# Patient Record
Sex: FEMALE | Race: WHITE | NOT HISPANIC OR LATINO | Employment: FULL TIME | URBAN - METROPOLITAN AREA
[De-identification: names, ages, dates, MRNs, and addresses within clinical notes are randomized per-mention and may not be internally consistent; named-entity substitution may affect disease eponyms.]

---

## 2018-07-24 ENCOUNTER — OFFICE VISIT CONVERTED (OUTPATIENT)
Dept: FAMILY MEDICINE CLINIC | Facility: CLINIC | Age: 33
End: 2018-07-24
Attending: NURSE PRACTITIONER

## 2018-10-24 ENCOUNTER — OFFICE VISIT CONVERTED (OUTPATIENT)
Dept: FAMILY MEDICINE CLINIC | Facility: CLINIC | Age: 33
End: 2018-10-24
Attending: NURSE PRACTITIONER

## 2018-11-19 ENCOUNTER — OFFICE VISIT (OUTPATIENT)
Dept: ORTHOPEDIC SURGERY | Facility: CLINIC | Age: 33
End: 2018-11-19

## 2018-11-19 VITALS — BODY MASS INDEX: 48.82 KG/M2 | WEIGHT: 293 LBS | TEMPERATURE: 98.7 F | HEIGHT: 65 IN

## 2018-11-19 DIAGNOSIS — S93.601A RIGHT FOOT SPRAIN, INITIAL ENCOUNTER: ICD-10-CM

## 2018-11-19 DIAGNOSIS — S99.921A FOOT INJURY, RIGHT, INITIAL ENCOUNTER: Primary | ICD-10-CM

## 2018-11-19 PROCEDURE — 99203 OFFICE O/P NEW LOW 30 MIN: CPT | Performed by: ORTHOPAEDIC SURGERY

## 2018-11-19 PROCEDURE — 73630 X-RAY EXAM OF FOOT: CPT | Performed by: ORTHOPAEDIC SURGERY

## 2018-11-19 RX ORDER — MONTELUKAST SODIUM 10 MG/1
TABLET ORAL
COMMUNITY
Start: 2018-11-13 | End: 2019-10-02

## 2018-11-19 RX ORDER — METHYLPHENIDATE HYDROCHLORIDE 20 MG/1
20 TABLET ORAL
COMMUNITY

## 2018-11-19 RX ORDER — ALBUTEROL SULFATE 90 UG/1
2 AEROSOL, METERED RESPIRATORY (INHALATION) EVERY 4 HOURS PRN
COMMUNITY
End: 2020-06-26 | Stop reason: SDUPTHER

## 2018-11-19 RX ORDER — IBUPROFEN 600 MG/1
600 TABLET ORAL
COMMUNITY
Start: 2017-07-11 | End: 2019-10-02
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
## 2018-12-14 ENCOUNTER — OFFICE VISIT CONVERTED (OUTPATIENT)
Dept: FAMILY MEDICINE CLINIC | Facility: CLINIC | Age: 33
End: 2018-12-14
Attending: NURSE PRACTITIONER

## 2019-01-23 ENCOUNTER — CONSULT (OUTPATIENT)
Dept: ORTHOPEDIC SURGERY | Facility: CLINIC | Age: 34
End: 2019-01-23

## 2019-01-23 VITALS — TEMPERATURE: 97.4 F | BODY MASS INDEX: 48.82 KG/M2 | WEIGHT: 293 LBS | HEIGHT: 65 IN

## 2019-01-23 DIAGNOSIS — S86.319A: ICD-10-CM

## 2019-01-23 DIAGNOSIS — S93.601A RIGHT FOOT SPRAIN, INITIAL ENCOUNTER: Primary | ICD-10-CM

## 2019-01-23 PROCEDURE — 99214 OFFICE O/P EST MOD 30 MIN: CPT | Performed by: ORTHOPAEDIC SURGERY

## 2019-01-23 PROCEDURE — 73630 X-RAY EXAM OF FOOT: CPT | Performed by: ORTHOPAEDIC SURGERY
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
## 2019-05-07 ENCOUNTER — TELEPHONE (OUTPATIENT)
Dept: ORTHOPEDIC SURGERY | Facility: CLINIC | Age: 34
End: 2019-05-07

## 2019-05-07 NOTE — TELEPHONE ENCOUNTER
Left message for patient to return call.     If / when patient calls, please notify that unfortunately [facility]'s schedule is full for tomorrow Wed 5/08/19 & that patient's appt is still scheduled for this Thursday 5/09/19 at 0800.

## 2019-05-07 NOTE — TELEPHONE ENCOUNTER
Notified patient that [facility] schedule full tmrw Wed 5/08/19 & patient to keep her appt Thurs 5/09/19 at 0800.

## 2019-05-07 NOTE — TELEPHONE ENCOUNTER
"Patient has OPNC/R FOOT/BIG TOE/NXR with [facility] this Thursday at 0800. Patient called stating her \"feet are hurting & it's painful to walk & stand\" - asking if [facility] will work in tomorrow Wed 5/08/19 instead? (Ok to work in at 11:10 AM?) Thanks /srh  "

## 2019-05-09 ENCOUNTER — OFFICE VISIT (OUTPATIENT)
Dept: ORTHOPEDIC SURGERY | Facility: CLINIC | Age: 34
End: 2019-05-09

## 2019-05-09 VITALS — BODY MASS INDEX: 48.82 KG/M2 | WEIGHT: 293 LBS | HEIGHT: 65 IN | TEMPERATURE: 98.1 F

## 2019-05-09 DIAGNOSIS — S92.354A CLOSED NONDISPLACED FRACTURE OF FIFTH METATARSAL BONE OF RIGHT FOOT, INITIAL ENCOUNTER: ICD-10-CM

## 2019-05-09 DIAGNOSIS — M79.672 LEFT FOOT PAIN: ICD-10-CM

## 2019-05-09 DIAGNOSIS — M77.42 METATARSALGIA OF LEFT FOOT: ICD-10-CM

## 2019-05-09 DIAGNOSIS — M19.079 ARTHRITIS OF FOOT: ICD-10-CM

## 2019-05-09 DIAGNOSIS — M79.671 RIGHT FOOT PAIN: Primary | ICD-10-CM

## 2019-05-09 PROCEDURE — 99214 OFFICE O/P EST MOD 30 MIN: CPT | Performed by: ORTHOPAEDIC SURGERY

## 2019-05-09 PROCEDURE — 73630 X-RAY EXAM OF FOOT: CPT | Performed by: ORTHOPAEDIC SURGERY

## 2019-05-09 RX ORDER — IPRATROPIUM BROMIDE AND ALBUTEROL SULFATE 2.5; .5 MG/3ML; MG/3ML
1 SOLUTION RESPIRATORY (INHALATION)
COMMUNITY
End: 2019-10-02
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
## 2019-05-30 ENCOUNTER — OFFICE VISIT (OUTPATIENT)
Dept: ORTHOPEDIC SURGERY | Facility: CLINIC | Age: 34
End: 2019-05-30

## 2019-05-30 VITALS — TEMPERATURE: 98.1 F | WEIGHT: 293 LBS | HEIGHT: 65 IN | BODY MASS INDEX: 48.82 KG/M2

## 2019-05-30 DIAGNOSIS — M19.079 ARTHRITIS OF FOOT: ICD-10-CM

## 2019-05-30 DIAGNOSIS — M79.671 RIGHT FOOT PAIN: Primary | ICD-10-CM

## 2019-05-30 DIAGNOSIS — M77.42 METATARSALGIA OF LEFT FOOT: ICD-10-CM

## 2019-05-30 DIAGNOSIS — S92.354D CLOSED NONDISPLACED FRACTURE OF FIFTH METATARSAL BONE OF RIGHT FOOT WITH ROUTINE HEALING, SUBSEQUENT ENCOUNTER: ICD-10-CM

## 2019-05-30 PROCEDURE — 73630 X-RAY EXAM OF FOOT: CPT | Performed by: ORTHOPAEDIC SURGERY

## 2019-05-30 PROCEDURE — 99213 OFFICE O/P EST LOW 20 MIN: CPT | Performed by: ORTHOPAEDIC SURGERY
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
## 2019-08-13 ENCOUNTER — LAB (OUTPATIENT)
Dept: LAB | Facility: HOSPITAL | Age: 34
End: 2019-08-13

## 2019-08-13 ENCOUNTER — TELEPHONE (OUTPATIENT)
Dept: URGENT CARE | Facility: CLINIC | Age: 34
End: 2019-08-13

## 2019-08-13 ENCOUNTER — TRANSCRIBE ORDERS (OUTPATIENT)
Dept: ADMINISTRATIVE | Facility: HOSPITAL | Age: 34
End: 2019-08-13

## 2019-08-13 ENCOUNTER — HOSPITAL ENCOUNTER (OUTPATIENT)
Dept: GENERAL RADIOLOGY | Facility: HOSPITAL | Age: 34
Discharge: HOME | End: 2019-08-13
Admitting: FAMILY MEDICINE

## 2019-08-13 ENCOUNTER — APPOINTMENT (OUTPATIENT)
Dept: GENERAL RADIOLOGY | Facility: HOSPITAL | Age: 34
End: 2019-08-13

## 2019-08-13 DIAGNOSIS — J06.9 ACUTE URI: Primary | ICD-10-CM

## 2019-08-13 DIAGNOSIS — J06.9 ACUTE URI: ICD-10-CM

## 2019-08-13 LAB
ALBUMIN SERPL-MCNC: 3.5 G/DL (ref 3.5–4.8)
ALBUMIN/GLOB SERPL: 1.2 G/DL (ref 1–1.7)
ALP SERPL-CCNC: 75 U/L (ref 32–91)
ALT SERPL W P-5'-P-CCNC: 25 U/L (ref 14–54)
ANION GAP SERPL CALCULATED.3IONS-SCNC: 14.2 MMOL/L (ref 5–15)
AST SERPL-CCNC: 21 U/L (ref 15–41)
BASOPHILS # BLD AUTO: 0.1 10*3/MM3 (ref 0–0.2)
BASOPHILS NFR BLD AUTO: 1.3 % (ref 0–1.5)
BILIRUB SERPL-MCNC: 0.7 MG/DL (ref 0.3–1.2)
BUN BLD-MCNC: 9 MG/DL (ref 8–20)
BUN/CREAT SERPL: 11.3 (ref 5.4–26.2)
CALCIUM SPEC-SCNC: 9.2 MG/DL (ref 8.9–10.3)
CHLORIDE SERPL-SCNC: 103 MMOL/L (ref 101–111)
CO2 SERPL-SCNC: 23 MMOL/L (ref 22–32)
CREAT BLD-MCNC: 0.8 MG/DL (ref 0.4–1)
DEPRECATED RDW RBC AUTO: 41.6 FL (ref 37–54)
EOSINOPHIL # BLD AUTO: 0.3 10*3/MM3 (ref 0–0.4)
EOSINOPHIL NFR BLD AUTO: 3.8 % (ref 0.3–6.2)
ERYTHROCYTE [DISTWIDTH] IN BLOOD BY AUTOMATED COUNT: 13.3 % (ref 12.3–15.4)
GFR SERPL CREATININE-BSD FRML MDRD: 83 ML/MIN/1.73
GLOBULIN UR ELPH-MCNC: 3 GM/DL (ref 2.5–3.8)
GLUCOSE BLD-MCNC: 91 MG/DL (ref 65–99)
HCT VFR BLD AUTO: 42.2 % (ref 34–46.6)
HGB BLD-MCNC: 14.5 G/DL (ref 12–15.9)
LYMPHOCYTES # BLD AUTO: 3 10*3/MM3 (ref 0.7–3.1)
LYMPHOCYTES NFR BLD AUTO: 37.7 % (ref 19.6–45.3)
MCH RBC QN AUTO: 32.1 PG (ref 26.6–33)
MCHC RBC AUTO-ENTMCNC: 34.2 G/DL (ref 31.5–35.7)
MCV RBC AUTO: 93.6 FL (ref 79–97)
MONOCYTES # BLD AUTO: 0.7 10*3/MM3 (ref 0.1–0.9)
MONOCYTES NFR BLD AUTO: 8.4 % (ref 5–12)
NEUTROPHILS # BLD AUTO: 3.9 10*3/MM3 (ref 1.7–7)
NEUTROPHILS NFR BLD AUTO: 48.8 % (ref 42.7–76)
NRBC BLD AUTO-RTO: 0.1 /100 WBC (ref 0–0.2)
PLATELET # BLD AUTO: 325 10*3/MM3 (ref 140–450)
PMV BLD AUTO: 8.8 FL (ref 6–12)
POTASSIUM BLD-SCNC: 4.2 MMOL/L (ref 3.6–5.1)
PROT SERPL-MCNC: 6.5 G/DL (ref 6.1–7.9)
RBC # BLD AUTO: 4.51 10*6/MM3 (ref 3.77–5.28)
SODIUM BLD-SCNC: 136 MMOL/L (ref 136–144)
WBC NRBC COR # BLD: 8 10*3/MM3 (ref 3.4–10.8)

## 2019-08-13 PROCEDURE — 80053 COMPREHEN METABOLIC PANEL: CPT

## 2019-08-13 PROCEDURE — 36415 COLL VENOUS BLD VENIPUNCTURE: CPT

## 2019-08-13 PROCEDURE — 71046 X-RAY EXAM CHEST 2 VIEWS: CPT

## 2019-08-13 PROCEDURE — 85025 COMPLETE CBC W/AUTO DIFF WBC: CPT

## 2019-10-10 ENCOUNTER — OFFICE VISIT (OUTPATIENT)
Dept: ORTHOPEDIC SURGERY | Facility: CLINIC | Age: 34
End: 2019-10-10

## 2019-10-10 ENCOUNTER — TELEPHONE (OUTPATIENT)
Dept: ORTHOPEDIC SURGERY | Facility: CLINIC | Age: 34
End: 2019-10-10

## 2019-10-10 VITALS — HEIGHT: 64 IN | WEIGHT: 293 LBS | TEMPERATURE: 98.7 F | BODY MASS INDEX: 50.02 KG/M2

## 2019-10-10 DIAGNOSIS — M25.561 RIGHT KNEE PAIN, UNSPECIFIED CHRONICITY: Primary | ICD-10-CM

## 2019-10-10 DIAGNOSIS — M23.91 LOCKING KNEE, RIGHT: ICD-10-CM

## 2019-10-10 DIAGNOSIS — M25.561 ACUTE PAIN OF RIGHT KNEE: ICD-10-CM

## 2019-10-10 PROCEDURE — 73562 X-RAY EXAM OF KNEE 3: CPT | Performed by: NURSE PRACTITIONER

## 2019-10-10 PROCEDURE — 99213 OFFICE O/P EST LOW 20 MIN: CPT | Performed by: NURSE PRACTITIONER

## 2019-10-10 NOTE — PROGRESS NOTES
Patient Name: Joann Smith   YOB: 1985  Referring Primary Care Physician: Angle Felix APRN  BMI: Body mass index is 56.64 kg/m².    Chief Complaint:    Chief Complaint   Patient presents with   • Right Knee - Establish Care        HPI: Knee Pain: Patient presents with a knee injury involving the  right knee. Onset of the symptoms was about a month ago. Inciting event: injured while in grocery turned and felt a pop. Current symptoms include giving out, locking and popping sensation. Pain is aggravated by going up and down stairs, lateral movements, pivoting, rising after sitting, running and squatting.  Patient has had no prior knee problems. Evaluation to date: ICC visit. Treatment to date: avoidance of offending activity, ice and OTC analgesics which are somewhat effective.  lmp 1 month ago -  deployed  Joann Smith is a 34 y.o. female who presents today for evaluation of   Chief Complaint   Patient presents with   • Right Knee - Establish Care   [facility] PT FOR FEET    This problem is new to this examiner.     Subjective   Medications:   Home Medications:  Current Outpatient Medications on File Prior to Visit   Medication Sig   • acyclovir (ZOVIRAX) 5 % ointment    • albuterol (PROVENTIL HFA;VENTOLIN HFA) 108 (90 Base) MCG/ACT inhaler Inhale 2 puffs Every 4 (Four) Hours As Needed for Wheezing.   • Fexofenadine HCl (ALLEGRA ALLERGY PO) Take  by mouth.   • methylphenidate (RITALIN) 20 MG tablet Take 20 mg by mouth.   • [DISCONTINUED] albuterol sulfate  (90 Base) MCG/ACT inhaler Inhale 2 puffs.   • [DISCONTINUED] diclofenac (VOLTAREN) 50 MG EC tablet Take 1 tablet by mouth 3 (Three) Times a Day As Needed (pain).     No current facility-administered medications on file prior to visit.      Current Medications:  Scheduled Meds:  Continuous Infusions:  No current facility-administered medications for this visit.   PRN Meds:.    I have reviewed the patient's medical history in detail and  updated the computerized patient record.  Review and summarization of old records includes:    Past Medical History:   Diagnosis Date   • Arthritis of foot 5/9/2019   • Asthma    • Bone cyst 1988 or 1989    I had a bone cyst removal on my right middle fingerm   • Fractures Various    Hairlines in my right wrist and both ankles   • Hernia, hiatal         Past Surgical History:   Procedure Laterality Date   • BONE CYST EXCISION      right hand knuckle, middle finger   • SINUS SURGERY     • TONSILLECTOMY     • WISDOM TOOTH EXTRACTION          Social History     Occupational History   • Not on file   Tobacco Use   • Smoking status: Never Smoker   • Smokeless tobacco: Never Used   Substance and Sexual Activity   • Alcohol use: No     Frequency: Never   • Drug use: No   • Sexual activity: Yes     Partners: Male     Birth control/protection: None      Social History     Social History Narrative   • Not on file        Family History   Problem Relation Age of Onset   • Hypertension Father    • Cancer Father         thyroid   • Diabetes Paternal Aunt    • Diabetes Paternal Uncle    • Diabetes Paternal Grandmother    • Cancer Paternal Grandmother         brain       ROS: 14 point review of systems was performed and all other systems were reviewed and are negative except for documented findings in HPI and today's encounter.     Allergies:   Allergies   Allergen Reactions   • Vancomycin Other (See Comments)     Pt. Reports she passed out    • Latex Rash   • Penicillins Rash     Constitutional:  Denies fever, shaking or chills   Eyes:  Denies change in visual acuity   HENT:  Denies nasal congestion or sore throat   Respiratory:  Denies cough or shortness of breath   Cardiovascular:  Denies chest pain or severe LE edema   GI:  Denies abdominal pain, nausea, vomiting, bloody stools or diarrhea   Musculoskeletal:  Numbness, tingling, pain, or loss of motor function only as noted above in history of present illness.  : Denies  "painful urination or hematuria  Integument:  Denies rash, lesion or ulceration   Neurologic:  Denies headache or focal weakness  Endocrine:  Denies lymphadenopathy  Psych:  Denies confusion or change in mental status   Hem:  Denies active bleeding    OBJECTIVE:  Physical Exam:   Temp 98.7 °F (37.1 °C) (Temporal)   Ht 162.6 cm (64\")   Wt (!) 150 kg (330 lb)   BMI 56.64 kg/m²     General Appearance:    Alert, cooperative, in no acute distress                  Eyes: conjunctiva clear  ENT: external ears and nose atraumatic  CV: no peripheral edema  Resp: normal respiratory effort  Skin: no rashes or wounds; normal turgor  Psych: mood and affect appropriate  Lymph: no nodes appreciated  Neuro: gross sensation intact  Vascular:  Palpable peripheral pulse in noted extremity  Musculoskeletal Extremities: Right knee tender to patient at the medial joint line skin is warm dry and intact calf is soft and nontender hip is nontender patient is neurovascular intact has locking and positive Bess's her leg is large and some pain under the kneecap.    Radiology:   3 views of right knee show no acute fracture done for acute pain no readily available views for comparison    Assessment:     ICD-10-CM ICD-9-CM   1. Right knee pain, unspecified chronicity M25.561 719.46   2. Acute pain of right knee M25.561 719.46   3. Locking knee, right M23.91 717.9        Procedures  We will check an MRI and see if there is a meniscus tear and patient will continue some conservative treatment thank you    Plan: Biomechanics of pertinent body area discussed.  Risks, benefits, alternatives, comparisons, and complications of accepted medicines, injections, recommendations, surgical procedures, and therapies explained and education provided in laymen's terms. Natural history and expected course of this patient's diagnosis discussed along with evaluation of therapies. Questions answered. When appropriate I also discussed proper use of cane, walker, " DocDep.   BMI:  The concept of BMI body mass index and its importance and implications discussed.  BMI suggested to be < 40 or as low as possible. Lifestyle measures for weight loss and how this affects orthopedic condition.  MEDICATIONS:  Prescription, OTC and Monitoring of Medications per orders to address ortho complaints; Evaluation and discussion of safety, precautions, side effects, and warnings given especially of long term NSAID or steroid therapy.    RICE: Rest, ice, compression, and elevation therapy, Cryotherapy/brachy therapy, and or OTC linaments as indicated with instructions.   MRI.      10/10/2019    Much of this encounter note is an electronic transcription/translation of spoken language to printed text. The electronic translation of spoken language may permit erroneous, or at times, nonsensical words or phrases to be inadvertently transcribed; Although I have reviewed the note for such errors, some may still exist

## 2019-10-10 NOTE — PATIENT INSTRUCTIONS
RICE for Routine Care of Injuries  Many injuries can be cared for using rest, ice, compression, and elevation (RICE therapy). Using RICE therapy can help to lessen pain and swelling. It can help your body to heal.  Rest  Reduce your normal activities and avoid using the injured part of your body. You can go back to your normal activities when you feel okay and your doctor says it is okay.  Ice  Do not put ice on your bare skin.  · Put ice in a plastic bag.  · Place a towel between your skin and the bag.  · Leave the ice on for 20 minutes, 2-3 times a day.  Do this for as long as told by your doctor.  Compression  Compression means putting pressure on the injured area. This can be done with an elastic bandage. If an elastic bandage has been applied:  · Remove and reapply the bandage every 3-4 hours or as told by your doctor.  · Make sure the bandage is not wrapped too tight. Wrap the bandage more loosely if part of your body beyond the bandage is blue, swollen, cold, painful, or loses feeling (numb).  · See your doctor if the bandage seems to make your problems worse.  Elevation  Elevation means keeping the injured area raised. Raise the injured area above your heart or the center of your chest if you can.  WHEN SHOULD I GET HELP?  You should get help if:  · You keep having pain and swelling.  · Your symptoms get worse.  WHEN SHOULD I GET HELP RIGHT AWAY?  You should get help right away if:  · You have sudden bad pain at or below the area of your injury.  · You have redness or more swelling around your injury.  · You have tingling or numbness at or below the injury that does not go away when you take off the bandage.     This information is not intended to replace advice given to you by your health care provider. Make sure you discuss any questions you have with your health care provider.     Document Released: 06/05/2009 Document Revised: 09/07/2016 Document Reviewed: 11/25/2015  [employer] Patient  Education ©2017 [employer]

## 2019-10-18 ENCOUNTER — HOSPITAL ENCOUNTER (OUTPATIENT)
Dept: MRI IMAGING | Facility: HOSPITAL | Age: 34
Discharge: HOME | End: 2019-10-18
Admitting: NURSE PRACTITIONER

## 2019-10-18 DIAGNOSIS — M23.91 LOCKING KNEE, RIGHT: ICD-10-CM

## 2019-10-18 DIAGNOSIS — M25.561 RIGHT KNEE PAIN, UNSPECIFIED CHRONICITY: ICD-10-CM

## 2019-10-18 DIAGNOSIS — M25.561 ACUTE PAIN OF RIGHT KNEE: ICD-10-CM

## 2019-10-18 PROCEDURE — 73721 MRI JNT OF LWR EXTRE W/O DYE: CPT

## 2019-10-21 ENCOUNTER — TELEPHONE (OUTPATIENT)
Dept: ORTHOPEDIC SURGERY | Facility: CLINIC | Age: 34
End: 2019-10-21

## 2019-10-22 NOTE — TELEPHONE ENCOUNTER
BLANCHE HAS SENT ME THE RESULTS OF THIS.  I WILL SEND TO YOU. IF YOU COULD CALL HER WITH THEM PLEASE.

## 2019-10-22 NOTE — TELEPHONE ENCOUNTER
I have not seen her for her knee.  It looks like Poonam saw her for this.  Please have her call her about MRI results and treatment going forward

## 2019-10-22 NOTE — TELEPHONE ENCOUNTER
Patient informed of results and wants to know if she can just follow up with [facility] as previously scheduled.  I told her I would check with him and call her back.    Per Poonam, MRI knee - no tear in ligament or cartillage - some arthritis under kneecap. Can follow with sports med for physical therapy and non surgical options - work on quad strengthening and weight loss all help stability.

## 2019-10-24 ENCOUNTER — APPOINTMENT (OUTPATIENT)
Dept: MRI IMAGING | Facility: HOSPITAL | Age: 34
End: 2019-10-24

## 2019-10-28 ENCOUNTER — OFFICE VISIT (OUTPATIENT)
Dept: ORTHOPEDIC SURGERY | Facility: CLINIC | Age: 34
End: 2019-10-28

## 2019-10-28 VITALS — BODY MASS INDEX: 50.02 KG/M2 | HEIGHT: 64 IN | TEMPERATURE: 97.5 F | WEIGHT: 293 LBS

## 2019-10-28 DIAGNOSIS — M94.261 CHONDROMALACIA OF RIGHT KNEE: Primary | ICD-10-CM

## 2019-10-28 PROCEDURE — 99214 OFFICE O/P EST MOD 30 MIN: CPT | Performed by: ORTHOPAEDIC SURGERY

## 2019-10-28 NOTE — PROGRESS NOTES
"Knee Exam      Patient: Joann Smith    YOB: 1985 34 y.o. female    Chief Complaints: knee doing a little better    History of Present Illness:Patient was seen by Poonam on 10/10/2019 and injured her right knee approximately 1 month prior to that when she was in a grocery and turned and felt a pop.    She was instructed that time to continue with ice and limit activity.  She had been taking an oral anti-inflammatory she thinks it was diclofenac that was given to her by the urgent care and has subsequent been using Advil.    Her pain has improved with mild intermittent aching pain over the anteromedial aspect of the right knee worse with steps.  She has not had any recurrent injury.  She evidently had some problems with the knee in the past and has done some therapy in the past and resume doing some of the exercises she had done previously.    I have seen her in the past for other injuries to her feet and was last seen on 5/30/2019.  Please see that note for any details regarding that  HPI    ROS: knee pain  Past Medical History:   Diagnosis Date   • Arthritis of foot 5/9/2019   • Asthma    • Bone cyst 1988 or 1989    I had a bone cyst removal on my right middle fingerm   • Fractures Various    Hairlines in my right wrist and both ankles   • Hernia, hiatal        Physical Exam:   Vitals:    10/28/19 0844   Temp: 97.5 °F (36.4 °C)   TempSrc: Temporal   Weight: (!) 154 kg (339 lb 9.6 oz)   Height: 162.6 cm (64\")     Well developed with normal mood.  On examination of the right knee do not appreciate any effusion but difficult to tell due to body habitus.  There was mild discomfort over the anteromedial aspect of the knee along the medial facet of the patella but apprehension or patellar instability.  Unable to elicit any exacerbation of pain with Bess's and grossly stable ligamentous exam.  No focal pain or fullness in the posterior aspect of the knee      Radiology: 3 views of the right knee " reviewed from 10/10/2019 which overall show relatively well-maintained joint space may be some mild narrowing medially at the patellofemoral joint but overall joints appear to be congruent without significant deformity.    MRI films and report of the right knee dated 10/18/2019 reviewed which show small joint effusion and a small partially ruptured Baker's cyst.  Meniscus appears intact bilaterally as to the ligaments.  There is some questionable partial-thickness fissure seen within the patellar cartilage and cartilage the medial and lateral compartments appears unremarkable.  No obvious suspicious osseous edema.      Assessment/Plan: 1.  Right knee pain with improving chondromalacial symptoms of the patella  2.  Asymptomatic right Baker's cyst    Reviewed with her that I do not see anything further to do at this point as far as any surgical treatment and she is improving with nonoperative measures.    She had taken some type of oral anti-inflammatories that have been prescribed at urgent care but is no longer using that and just using ibuprofen with continued relief.    She may continue with that as needed and have her limit any squatting kneeling or twisting activities and limit steps.    She may apply Pennsaid to the area twice daily as needed and I have her start some physical therapy as well.    I will see her back as needed

## 2019-10-31 ENCOUNTER — TREATMENT (OUTPATIENT)
Dept: PHYSICAL THERAPY | Facility: CLINIC | Age: 34
End: 2019-10-31

## 2019-10-31 DIAGNOSIS — M94.261 CHONDROMALACIA OF RIGHT KNEE: Primary | ICD-10-CM

## 2019-10-31 PROCEDURE — 97110 THERAPEUTIC EXERCISES: CPT | Performed by: PHYSICAL THERAPIST

## 2019-10-31 PROCEDURE — 97161 PT EVAL LOW COMPLEX 20 MIN: CPT | Performed by: PHYSICAL THERAPIST

## 2019-10-31 PROCEDURE — 97140 MANUAL THERAPY 1/> REGIONS: CPT | Performed by: PHYSICAL THERAPIST

## 2019-10-31 NOTE — PROGRESS NOTES
Physical Therapy Initial Evaluation and Plan of Care    Patient: Joann Smith   : 1985  Diagnosis/ICD-10 Code:  Chondromalacia of right knee [M94.261]  Referring practitioner: Mike Huynh*  Date of Initial Visit: 10/31/2019  Today's Date: 10/31/2019  Patient seen for 1 session           Subjective Questionnaire: LEFS: 27 points      Subjective Evaluation    History of Present Illness  Onset date: 2019.  Mechanism of injury: Patient presents to physical therapy with orders to address R knee pain.  She states that it has been bothering her since the end of September.  She doesn't recall a specific trauma.  She states that she just twisted wrong on it at the grocery store.  They initially thought she had a torn meniscus but her MRI was negative.   It showed a partially ruptured Baker's cyst and some fissures in the knee.  She was diagnosed with chondromalacia and referred here for physical therapy.    She states that she has trouble with prolonged standing and prolonged sitting.   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  She states that it has been a challenge getting into bed because it hurts to put pressure on the center of her knee.       Patient Occupation:  for [employer] and [employer] at the [employer], also working at the . Pain  Current pain ratin (Raining outside)  At best pain ratin  At worst pain ratin  Location: R knee medial joint line  Relieving factors: ice and rest (elevation)  Aggravating factors: prolonged positioning, standing, squatting and ambulation (kneeling, prolonged sitting)    Social Support  Lives in: one-story house  Lives with: spouse (-active duty )    Diagnostic Tests  MRI studies: abnormal    Patient Goals  Patient goals for therapy: decreased pain, increased strength, return to work and  increased motion  Patient goal: put her own socks on independently       Objective       Static Posture   General Observations  Left leg length discrepancy.     Knee   Knee (Left): Recurvatum.   Knee (Right): Flexed.     Palpation     Right Tenderness of the vastus medialis.     Tenderness     Right Knee   Tenderness in the MCL (distal), MCL (proximal), medial joint line and medial retinaculum.     Neurological Testing     Sensation     Knee     Right Knee   Intact: light touch     Active Range of Motion   Left Knee   Flexion: 105 degrees   Extension: 10 degrees     Right Knee   Flexion: 100 degrees   Extension: 0 degrees     Patellar Mobility     Right Knee Hypomobile in the medial, lateral, superior and inferior patellar tendon(s).     Patellar Static Positioning   Right Knee: lateral tilt    Strength/Myotome Testing     Right Hip   Planes of Motion   Flexion: 4  Extension: 3+  Abduction: 3+  Adduction: 3+    Right Knee   Flexion: 4+  Extension: 4  Quadriceps contraction: fair         Assessment & Plan     Assessment  Impairments: abnormal gait, abnormal muscle firing, abnormal or restricted ROM, activity intolerance, impaired balance, impaired physical strength, lacks appropriate home exercise program, pain with function and weight-bearing intolerance  Assessment details: The patient is a 34 y.o. female who presents to physical therapy today for R knee pain and chondromalacia. Upon initial evaluation, the patient demonstrates the impairments listed above. Due to these impairments, the patient is unable to tolerate a full day of activity and complete her work tasks without pain. The patient would benefit from skilled PT services to address functional limitations and impairments and to improve patient quality of life.    Prognosis: good  Functional Limitations: walking, uncomfortable because of pain, sitting, standing, stooping and unable to perform repetitive tasks  Goals  Plan Goals: STG's: 3 weeks   Patient will  report pain level at worse </= 5/10 for improved tolerance to ADLs and functional mobility  Patient will require <3 tactile or verbal cues for proper mechanics during completion of her HEP      LTG's: By Discharge  Patient will report pain levels at worst </= 3/10 for improved tolerance to ADLs and functional mobility  Patient will be able to ambulate unlimited distances and no increased pain  Patient will be able to complete single leg stance on stable surface with no UE support, with supervision for durations > 15 seconds on R LE to decrease risk of falls  Patient will report >75% improvement in her ability to tolerate sitting for durations > 60 minutes per reduction in her symptoms   Patient will report improved levels of overall function as demonstrated by an increase in her reported level of function score on the LEFS to >/= 50/80    Patient will report improvement in their tolerance to sleeping and report waking up </= 1x/night per positional discomfort  Patient will require no tactile or verbal cues for proper mechaics during completion of her HEP for final independence     Plan  Therapy options: will be seen for skilled physical therapy services  Planned modality interventions: cryotherapy, electrical stimulation/Russian stimulation, TENS, thermotherapy (hydrocollator packs) and ultrasound  Other planned modality interventions: Dry Needling  Planned therapy interventions: balance/weight-bearing training, flexibility, functional ROM exercises, gait training, home exercise program, joint mobilization, manual therapy, neuromuscular re-education, soft tissue mobilization, strengthening, stretching and therapeutic activities  Duration in visits: 12  Treatment plan discussed with: patient        History # of Personal Factors and/or Comorbidities: LOW (0)  Examination of Body System(s): # of elements: MODERATE (3)  Clinical Presentation: STABLE   Clinical Decision Making: LOW       Timed:         Manual Therapy:     10     mins  22540;     Therapeutic Exercise:    15     mins  22616;     Neuromuscular Curt:        mins  95097;    Therapeutic Activity:          mins  80463;     Gait Training:           mins  49531;     Ultrasound:          mins  93619;    Ionto                                   mins   50941  Self Care                            mins   60259  Canalith Repos         mins 27719      Un-Timed:  Electrical Stimulation:         mins  87674 ( );  Dry Needling          mins self-pay  Traction          mins 98571  Low Eval     20     Mins  37019  Mod Eval          Mins  81033  High Eval                            Mins  46184  Re-Eval                               mins  09167        Timed Treatment:   25   mins   Total Treatment:     55   mins    PT SIGNATURE: Leatha Bolivar PT   DATE TREATMENT INITIATED: 10/31/2019    Initial Certification  Certification Period: 1/29/2020  I certify that the therapy services are furnished while this patient is under my care.  The services outlined above are required by this patient, and will be reviewed every 90 days.     PHYSICIAN: Mike Huynh MD      DATE:     Please sign and return via fax to [phone].. Thank you, [facility].

## 2019-11-04 ENCOUNTER — TREATMENT (OUTPATIENT)
Dept: PHYSICAL THERAPY | Facility: CLINIC | Age: 34
End: 2019-11-04

## 2019-11-04 DIAGNOSIS — M94.261 CHONDROMALACIA OF RIGHT KNEE: Primary | ICD-10-CM

## 2019-11-04 PROCEDURE — 97110 THERAPEUTIC EXERCISES: CPT | Performed by: PHYSICAL THERAPIST

## 2019-11-04 PROCEDURE — 97014 ELECTRIC STIMULATION THERAPY: CPT | Performed by: PHYSICAL THERAPIST

## 2019-11-04 PROCEDURE — 97140 MANUAL THERAPY 1/> REGIONS: CPT | Performed by: PHYSICAL THERAPIST

## 2019-11-06 ENCOUNTER — TELEPHONE (OUTPATIENT)
Dept: ORTHOPEDIC SURGERY | Facility: CLINIC | Age: 34
End: 2019-11-06

## 2019-11-06 NOTE — TELEPHONE ENCOUNTER
Patient given Pennsaid solution Rx from [facility]. Says insurance does not cover it and she does not qualify for discount. Will be $2,500. [facility] [phone]  Please advise.

## 2019-11-06 NOTE — TELEPHONE ENCOUNTER
I spoke with the patient and he said that the Pennsaid was not covered through her insurance that she spoke with someone about this and was referred to another pharmacy.  I was not sure the exact workings of this was managed and he was that he came from mail-order out of [place].    We will have the office look into this and let her know and if she still does not qualify for it we may know to try Voltaren gel but other than that I do not have anything further to offer her

## 2019-11-07 NOTE — TELEPHONE ENCOUNTER
Spoke with Marcelina at [facility] in [place]. They do not have a [place] license so they transferred it to [facility] in [place] that does.  Because of having  is the reason why she is not eliegible for discount of 10.00.    Voltaren sent into pharm and pt informed

## 2019-11-08 ENCOUNTER — TREATMENT (OUTPATIENT)
Dept: PHYSICAL THERAPY | Facility: CLINIC | Age: 34
End: 2019-11-08

## 2019-11-08 DIAGNOSIS — M94.261 CHONDROMALACIA OF RIGHT KNEE: Primary | ICD-10-CM

## 2019-11-08 PROCEDURE — 97140 MANUAL THERAPY 1/> REGIONS: CPT | Performed by: PHYSICAL THERAPIST

## 2019-11-08 PROCEDURE — 97110 THERAPEUTIC EXERCISES: CPT | Performed by: PHYSICAL THERAPIST

## 2019-11-08 PROCEDURE — 97014 ELECTRIC STIMULATION THERAPY: CPT | Performed by: PHYSICAL THERAPIST

## 2019-11-08 NOTE — PROGRESS NOTES
Physical Therapy Daily Progress Note    VISIT#: 3    Subjective   Joann Smith reports that she does her stretches every other day and swims on the opposite days.  She feels like that has been really helpful.    Pain Rating (0-10): 2    Objective     See Exercise, Manual, and Modality Logs for complete treatment.     Patient Education: Continue current HEP    Assessment & Plan     Assessment  Assessment details: Patient is responding well to treatment and current HEP. She is reporting a reduction in pain and an improvement in daily function.  She reports a reduction in pain after modalities.          Progress per Plan of Care and Progress strengthening /stabilization /functional activity            Timed:         Manual Therapy:    15     mins  25918;     Therapeutic Exercise:    25     mins  81628;     Neuromuscular Curt:        mins  68877;    Therapeutic Activity:          mins  60461;     Gait Training:           mins  19535;     Ultrasound:          mins  19261;    Ionto                                   mins   92065  Self Care                            mins   82408  Canalith Repos                   mins  65306    Un-Timed:  Electrical Stimulation:    15     mins  82150 ( );  Dry Needling          mins self-pay  Traction          mins 07998  Low Eval          Mins  93658  Mod Eval          Mins  47249  High Eval                            Mins  14077  Re-Eval                               mins  36488    Timed Treatment:   40   mins   Total Treatment:     55   mins    Leatha Bolivar PT  [place] License # [id number]  Physical Therapist

## 2019-11-11 ENCOUNTER — TREATMENT (OUTPATIENT)
Dept: PHYSICAL THERAPY | Facility: CLINIC | Age: 34
End: 2019-11-11

## 2019-11-11 DIAGNOSIS — M94.261 CHONDROMALACIA OF RIGHT KNEE: Primary | ICD-10-CM

## 2019-11-11 PROCEDURE — 97112 NEUROMUSCULAR REEDUCATION: CPT | Performed by: PHYSICAL THERAPIST

## 2019-11-11 PROCEDURE — 97110 THERAPEUTIC EXERCISES: CPT | Performed by: PHYSICAL THERAPIST

## 2019-11-11 PROCEDURE — 97140 MANUAL THERAPY 1/> REGIONS: CPT | Performed by: PHYSICAL THERAPIST

## 2019-11-11 NOTE — PROGRESS NOTES
Physical Therapy Daily Progress Note    VISIT#: 4    Subjective   Joann Smith reports that her knee is feeling a lot better.  She is leaving for vacation in days and will be gone for a week.  Pain Rating (0-10): 2    Objective     See Exercise, Manual, and Modality Logs for complete treatment.     Patient Education: Continue current HEP    Assessment & Plan     Assessment  Assessment details: Patient demonstrated a significant improvement in patellar mobility today.  She is reporting a decrease in her pain and improvement in her function.      Progress per Plan of Care and Progress strengthening /stabilization /functional activity            Timed:         Manual Therapy:    8     mins  31975;     Therapeutic Exercise:    15     mins  40458;     Neuromuscular Curt:    17    mins  27394;    Therapeutic Activity:          mins  45273;     Gait Training:           mins  61735;     Ultrasound:          mins  53536;    Ionto                                   mins   29463  Self Care                            mins   66925  Canalith Repos                   mins  13218    Un-Timed:  Electrical Stimulation:         mins  63374 ( );  Dry Needling          mins self-pay  Traction          mins 83026  Low Eval          Mins  79861  Mod Eval          Mins  78188  High Eval                            Mins  76002  Re-Eval                               mins  26863    Timed Treatment:   40   mins   Total Treatment:     50   mins    Leatha Bolivar PT  [place] License # [id number]  Physical Therapist

## 2019-11-12 ENCOUNTER — TREATMENT (OUTPATIENT)
Dept: PHYSICAL THERAPY | Facility: CLINIC | Age: 34
End: 2019-11-12

## 2019-11-12 DIAGNOSIS — M94.261 CHONDROMALACIA OF RIGHT KNEE: Primary | ICD-10-CM

## 2019-11-12 PROCEDURE — 97110 THERAPEUTIC EXERCISES: CPT | Performed by: PHYSICAL THERAPIST

## 2019-11-12 PROCEDURE — 97112 NEUROMUSCULAR REEDUCATION: CPT | Performed by: PHYSICAL THERAPIST

## 2019-11-12 PROCEDURE — 97140 MANUAL THERAPY 1/> REGIONS: CPT | Performed by: PHYSICAL THERAPIST

## 2019-11-12 NOTE — PROGRESS NOTES
Physical Therapy Daily Progress Note    VISIT#: 5    Subjective   Joann Smith reports that she is continuing to feel better.   She is leaving town for vacation tomorrow.  Pain Rating (0-10): 2    Objective     See Exercise, Manual, and Modality Logs for complete treatment.     Patient Education: Continue current HEP    Assessment & Plan     Assessment  Assessment details: Patient is continuing to make good progress towards her goals.  She declined cryotherapy after exercises because she was doing so well.           Progress per Plan of Care and Progress strengthening /stabilization /functional activity.  Patient will be out of town for one week.            Timed:         Manual Therapy:    8     mins  93901;     Therapeutic Exercise:    15     mins  64416;     Neuromuscular Curt:   17     mins  48330;    Therapeutic Activity:          mins  64574;     Gait Training:           mins  60622;     Ultrasound:          mins  49183;    Ionto                                   mins   43130  Self Care                            mins   09066  Canalith Repos                   mins  50959    Un-Timed:  Electrical Stimulation:         mins  82844 ( );  Dry Needling          mins self-pay  Traction          mins 82956  Low Eval          Mins  26803  Mod Eval          Mins  76601  High Eval                            Mins  27257  Re-Eval                               mins  24182    Timed Treatment:   40   mins   Total Treatment:     40   mins    Leatha Bolivar PT  [place] License # [id number]  Physical Therapist

## 2019-11-25 ENCOUNTER — TREATMENT (OUTPATIENT)
Dept: PHYSICAL THERAPY | Facility: CLINIC | Age: 34
End: 2019-11-25

## 2019-11-25 DIAGNOSIS — M94.261 CHONDROMALACIA OF RIGHT KNEE: Primary | ICD-10-CM

## 2019-11-25 PROCEDURE — 97112 NEUROMUSCULAR REEDUCATION: CPT | Performed by: PHYSICAL THERAPIST

## 2019-11-25 PROCEDURE — 97140 MANUAL THERAPY 1/> REGIONS: CPT | Performed by: PHYSICAL THERAPIST

## 2019-11-25 PROCEDURE — 97110 THERAPEUTIC EXERCISES: CPT | Performed by: PHYSICAL THERAPIST

## 2019-11-25 NOTE — PROGRESS NOTES
Physical Therapy Daily Progress Note    VISIT#: 6    Subjective   Joann Smith reports that her knee did really well on vacation.  She did a lot of walking and she didn't really have any pain unless she was hurrying.  Pain Rating (0-10): 0    Objective     See Exercise, Manual, and Modality Logs for complete treatment.     Patient Education: Continue current HEP    Assessment & Plan     Assessment  Assessment details: Patient is progressing really well with treatment and has had a significant reduction in pain.   She is making great progress towards her goals and should be ready for discharge soon if her pain remains low/absent.        Progress per Plan of Care and Progress strengthening /stabilization /functional activity          Timed:         Manual Therapy:   8     mins  22139;     Therapeutic Exercise:    15     mins  91767;     Neuromuscular Curt:    22    mins  95839;    Therapeutic Activity:          mins  61253;     Gait Training:           mins  15866;     Ultrasound:          mins  02819;    Ionto                                   mins   88687  Self Care                            mins   55914  Canalith Repos                    mins  08843    Un-Timed:  Electrical Stimulation:         mins  04679 ( );  Dry Needling          mins self-pay  Traction          mins 60752  Low Eval          Mins  39409  Mod Eval          Mins  66613  High Eval                            Mins  64314  Re-Eval                               mins  17284    Timed Treatment:   45   mins   Total Treatment:     45   mins    Leatha Bolivar PT  [place] License # [id number]  Physical Therapist

## 2019-11-26 ENCOUNTER — TREATMENT (OUTPATIENT)
Dept: PHYSICAL THERAPY | Facility: CLINIC | Age: 34
End: 2019-11-26

## 2019-11-26 DIAGNOSIS — M94.261 CHONDROMALACIA OF RIGHT KNEE: Primary | ICD-10-CM

## 2019-11-26 PROCEDURE — 97112 NEUROMUSCULAR REEDUCATION: CPT | Performed by: PHYSICAL THERAPIST

## 2019-11-26 PROCEDURE — 97110 THERAPEUTIC EXERCISES: CPT | Performed by: PHYSICAL THERAPIST

## 2019-11-26 NOTE — PROGRESS NOTES
Physical Therapy Daily Progress Note    VISIT#: 7    Subjective     LEFS: 75 points    Joann Smith reports that she is feeling a lot better but her knee still pops with some activities.   She denies any pain today but had some pain with walking long distances on vacation.    Pain Rating (0-10): 0    Objective       Active Range of Motion     Right Knee   Flexion: 118 degrees   Extension: 5 degrees     Strength/Myotome Testing     Right Hip   Planes of Motion   Flexion: 5  Extension: 4  Abduction: 4+    Right Knee   Flexion: 5  Extension: 4+    Ambulation   Weight-Bearing Status   Assistive device used: none     See Exercise, Manual, and Modality Logs for complete treatment.     Patient Education: Focus on HEP compliance    Assessment & Plan     Assessment  Assessment details: Patient has been seen for 7 visits in outpatient physical therapy.  She has made significant progress in her pain reduction, improved ROM, strength, balance and gait.       Goals  Plan Goals: STG's: 3 weeks   Patient will report pain level at worse </= 5/10 for improved tolerance to ADLs and functional mobility - MET  Patient will require <3 tactile or verbal cues for proper mechanics during completion of her HEP - MET    LTG's: By Discharge  Patient will report pain levels at worst </= 3/10 for improved tolerance to ADLs and functional mobility - PARTIALLY MET  Patient will be able to ambulate unlimited distances and no increased pain - PARTIALLY  MET  Patient will be able to complete single leg stance on stable surface with no UE support, with supervision for durations > 15 seconds on R LE to decrease risk of falls - PARTIALLY MET  Patient will report >75% improvement in her ability to tolerate sitting for durations > 60 minutes per reduction in her symptoms - MET  Patient will report improved levels of overall function as demonstrated by an increase in her reported level of function score on the LEFS to >/= 50/80  - MET  Patient will  report improvement in their tolerance to sleeping and report waking up </= 1x/night per positional discomfort - MET  Patient will require no tactile or verbal cues for proper mechaics during completion of her HEP for final independence  - MET      Plan  Treatment plan discussed with: patient  Plan details: Hold PT x 2 weeks.  If she continues to do well, will discharge at that time.  If her pain persists, will continue with current plan of care.            Timed:         Manual Therapy:         mins  71333;     Therapeutic Exercise:    15     mins  59323;     Neuromuscular Curt:    25    mins  78065;    Therapeutic Activity:          mins  06593;     Gait Training:           mins  66107;     Ultrasound:          mins  68031;    Ionto                                   mins   37698  Self Care                            mins   29838  Canalith Repos                   mins  80825    Un-Timed:  Electrical Stimulation:         mins  89462 ( );  Dry Needling          mins self-pay  Traction          mins 78640  Low Eval          Mins  59324  Mod Eval          Mins  38482  High Eval                            Mins  68889  Re-Eval                               mins  19954    Timed Treatment:   40   mins   Total Treatment:     40   mins    Leatha Bolivar PT  [place] License # [id number]  Physical Therapist

## 2019-12-23 ENCOUNTER — DOCUMENTATION (OUTPATIENT)
Dept: PHYSICAL THERAPY | Facility: CLINIC | Age: 34
End: 2019-12-23

## 2019-12-23 NOTE — PROGRESS NOTES
Discharge Summary  Discharge Summary from Physical Therapy Report      Dates  PT visit: 10/31/19-11/26/19  Number of Visits: 7     Discharge Status of Patient: See MD Note dated 11/26/19    Goals: Partially Met    Discharge Plan: Continue with current home exercise program as instructed  Patient to return to referring/providing physician    Comments Patient did not return after last visit because she was doing well.    Date of Discharge 12/23/19        Leatha Bolivar, PT  Physical Therapist

## 2020-05-31 ENCOUNTER — TELEMEDICINE (OUTPATIENT)
Dept: FAMILY MEDICINE CLINIC | Facility: TELEHEALTH | Age: 35
End: 2020-05-31

## 2020-05-31 DIAGNOSIS — L23.9 ALLERGIC CONTACT DERMATITIS, UNSPECIFIED TRIGGER: Primary | ICD-10-CM

## 2020-05-31 PROCEDURE — 99213 OFFICE O/P EST LOW 20 MIN: CPT | Performed by: NURSE PRACTITIONER

## 2020-05-31 RX ORDER — TRIAMCINOLONE ACETONIDE 1 MG/G
CREAM TOPICAL 2 TIMES DAILY
Qty: 28.4 G | Refills: 0 | OUTPATIENT
Start: 2020-05-31 | End: 2020-06-01

## 2020-05-31 NOTE — PROGRESS NOTES
Subjective   Joann Smith is a 34 y.o. female seen by virtual visit for rash on neck.     Rash   This is a new problem. The current episode started in the past 7 days (2 days ago). The problem is unchanged. The affected locations include the neck (right side of neck). The rash is characterized by itchiness (NOT draining, crusting, or blistering). Associated with: says she had a necklace on when this started and though it might be the necklace; took it off yesterday but hasn't helped. Pertinent negatives include no anorexia, congestion, cough, diarrhea, facial edema, fatigue, fever, rhinorrhea, shortness of breath, sore throat or vomiting. (No throat swelling) Treatments tried: Once the day it started, a co-worker gave her some HCT, which helped that day. The treatment provided mild relief. Her past medical history is significant for allergies.        The following portions of the patient's history were reviewed and updated as appropriate: allergies, current medications, past family history, past medical history, past social history, past surgical history and problem list.    Review of Systems   Constitutional: Negative for activity change, fatigue and fever.   HENT: Negative for congestion, rhinorrhea and sore throat.    Respiratory: Negative for cough and shortness of breath.    Cardiovascular: Negative for leg swelling.   Gastrointestinal: Negative for anorexia, diarrhea, nausea and vomiting.   Musculoskeletal: Negative for arthralgias and myalgias.   Skin: Positive for rash.   Allergic/Immunologic: Positive for environmental allergies. Negative for food allergies and immunocompromised state.       Objective   Physical Exam   Constitutional: She is oriented to person, place, and time. She appears well-developed and well-nourished. No distress.   HENT:   Head: Normocephalic and atraumatic.   No facial edema noted   Pulmonary/Chest: Effort normal. No respiratory distress.   Neurological: She is alert and oriented  "to person, place, and time.   Skin: She is not diaphoretic.   I am able to appreciate a papular erythematous rash on right neck base, in the \"necklace line\".  One excoriated area, which she says is from scratching.  No obvious drainage, crusting, or cellulitis.   Psychiatric: She has a normal mood and affect. Her behavior is normal. Judgment and thought content normal.         Assessment/Plan   Joann was seen today for rash.    Diagnoses and all orders for this visit:    Allergic contact dermatitis, unspecified trigger    Other orders  -     triamcinolone (KENALOG) 0.1 % cream; Apply  topically to the appropriate area as directed 2 (Two) Times a Day.      She had Benadryl at home, 25 mg, and I recommended that she try that every 6 hours to help with itching as well.  Asked her to F/U at  in 2-3 days if not improved or sooner if worse in any way.         "

## 2020-05-31 NOTE — PATIENT INSTRUCTIONS
Contact Dermatitis  Dermatitis is redness, soreness, and swelling (inflammation) of the skin. Contact dermatitis is a reaction to certain substances that touch the skin. Many different substances can cause contact dermatitis. There are two types of contact dermatitis:  · Irritant contact dermatitis. This type is caused by something that irritates your skin, such as having dry hands from washing them too often with soap. This type does not require previous exposure to the substance for a reaction to occur. This is the most common type.  · Allergic contact dermatitis. This type is caused by a substance that you are allergic to, such as poison ivy. This type occurs when you have been exposed to the substance (allergen) and develop a sensitivity to it. Dermatitis may develop soon after your first exposure to the allergen, or it may not develop until the next time you are exposed and every time thereafter.  What are the causes?  Irritant contact dermatitis is most commonly caused by exposure to:  · Makeup.  · Soaps.  · Detergents.  · Bleaches.  · Acids.  · Metal salts, such as nickel.  Allergic contact dermatitis is most commonly caused by exposure to:  · Poisonous plants.  · Chemicals.  · Jewelry.  · Latex.  · Medicines.  · Preservatives in products, such as clothing.  What increases the risk?  You are more likely to develop this condition if you have:  · A job that exposes you to irritants or allergens.  · Certain medical conditions, such as asthma or eczema.  What are the signs or symptoms?  Symptoms of this condition may occur on your body anywhere the irritant has touched you or is touched by you.  · Symptoms include:  ? Dryness or flaking.  ? Redness.  ? Cracks.  ? Itching.  ? Pain or a burning feeling.  ? Blisters.  ? Drainage of small amounts of blood or clear fluid from skin cracks.  With allergic contact dermatitis, there may also be swelling in areas such as the eyelids, mouth, or genitals.  How is this  diagnosed?  This condition is diagnosed with a medical history and physical exam.  · A patch skin test may be performed to help determine the cause.  · If the condition is related to your job, you may need to see an occupational medicine specialist.  How is this treated?  This condition is treated by checking for the cause of the reaction and protecting your skin from further contact. Treatment may also include:  · Steroid creams or ointments. Oral steroid medicines may be needed in more severe cases.  · Antibiotic medicines or antibacterial ointments, if a skin infection is present.  · Antihistamine lotion or an antihistamine taken by mouth to ease itching.  · A bandage (dressing).  Follow these instructions at home:  Skin care  · Moisturize your skin as needed.  · Apply cool compresses to the affected areas.  · Try applying baking soda paste to your skin. Stir water into baking soda until it reaches a paste-like consistency.  · Do not scratch your skin, and avoid friction to the affected area.  · Avoid the use of soaps, perfumes, and dyes.  Medicines  · Take or apply over-the-counter and prescription medicines only as told by your health care provider.  · If you were prescribed an antibiotic medicine, take or apply the antibiotic as told by your health care provider. Do not stop using the antibiotic even if your condition improves.  Bathing  · Try taking a bath with:  ? Epsom salts. Follow the instructions on the packaging. You can get these at your local pharmacy or grocery store.  ? Baking soda. Pour a small amount into the bath as directed by your health care provider.  ? Colloidal oatmeal. Follow the instructions on the packaging. You can get this at your local pharmacy or grocery store.  · Bathe less frequently, such as every other day.  · Bathe in lukewarm water. Avoid using hot water.  Bandage care  · If you were given a bandage (dressing), change it as told by your health care provider.  · Wash your hands  with soap and water before and after you change your dressing. If soap and water are not available, use hand .  General instructions  · Avoid the substance that caused your reaction. If you do not know what caused it, keep a journal to try to track what caused it. Write down:  ? What you eat.  ? What cosmetic products you use.  ? What you drink.  ? What you wear in the affected area. This includes jewelry.  · Check the affected areas every day for signs of infection. Check for:  ? More redness, swelling, or pain.  ? More fluid or blood.  ? Warmth.  ? Pus or a bad smell.  · Keep all follow-up visits as told by your health care provider. This is important.  Contact a health care provider if:  · Your condition does not improve with treatment.  · Your condition gets worse.  · You have signs of infection such as swelling, tenderness, redness, soreness, or warmth in the affected area.  · You have a fever.  · You have new symptoms.  Get help right away if:  · You have a severe headache, neck pain, or neck stiffness.  · You vomit.  · You feel very sleepy.  · You notice red streaks coming from the affected area.  · Your bone or joint underneath the affected area becomes painful after the skin has healed.  · The affected area turns darker.  · You have difficulty breathing.  Summary  · Dermatitis is redness, soreness, and swelling (inflammation) of the skin. Contact dermatitis is a reaction to certain substances that touch the skin.  · Symptoms of this condition may occur on your body anywhere the irritant has touched you or is touched by you.  · This condition is treated by figuring out what caused the reaction and protecting your skin from further contact. Treatment may also include medicines and skin care.  · Avoid the substance that caused your reaction. If you do not know what caused it, keep a journal to try to track what caused it.  · Contact a health care provider if your condition gets worse or you have signs  of infection such as swelling, tenderness, redness, soreness, or warmth in the affected area.  This information is not intended to replace advice given to you by your health care provider. Make sure you discuss any questions you have with your health care provider.  Document Released: 12/15/2001 Document Revised: 04/08/2020 Document Reviewed: 07/03/2019  Elsevier Patient Education © 2020 [employer]

## 2020-06-26 ENCOUNTER — LAB (OUTPATIENT)
Dept: LAB | Facility: HOSPITAL | Age: 35
End: 2020-06-26

## 2020-06-26 ENCOUNTER — OFFICE VISIT (OUTPATIENT)
Dept: FAMILY MEDICINE CLINIC | Facility: CLINIC | Age: 35
End: 2020-06-26

## 2020-06-26 VITALS
HEART RATE: 90 BPM | WEIGHT: 293 LBS | BODY MASS INDEX: 48.82 KG/M2 | RESPIRATION RATE: 20 BRPM | HEIGHT: 65 IN | DIASTOLIC BLOOD PRESSURE: 90 MMHG | SYSTOLIC BLOOD PRESSURE: 126 MMHG | OXYGEN SATURATION: 98 %

## 2020-06-26 DIAGNOSIS — E03.9 HYPOTHYROIDISM, UNSPECIFIED TYPE: ICD-10-CM

## 2020-06-26 DIAGNOSIS — E04.2 NONTOXIC MULTINODULAR GOITER: ICD-10-CM

## 2020-06-26 DIAGNOSIS — Z13.9 ENCOUNTER FOR HEALTH-RELATED SCREENING: ICD-10-CM

## 2020-06-26 DIAGNOSIS — J45.20 MILD INTERMITTENT ASTHMA WITHOUT COMPLICATION: Primary | ICD-10-CM

## 2020-06-26 DIAGNOSIS — E66.01 CLASS 3 SEVERE OBESITY WITH SERIOUS COMORBIDITY AND BODY MASS INDEX (BMI) OF 50.0 TO 59.9 IN ADULT, UNSPECIFIED OBESITY TYPE (HCC): ICD-10-CM

## 2020-06-26 PROBLEM — J45.909 ASTHMA: Status: ACTIVE | Noted: 2020-06-26

## 2020-06-26 LAB
25(OH)D3 SERPL-MCNC: 16.6 NG/ML (ref 30–100)
ALBUMIN SERPL-MCNC: 4.7 G/DL (ref 3.5–5.2)
ALBUMIN/GLOB SERPL: 1.7 G/DL
ALP SERPL-CCNC: 65 U/L (ref 39–117)
ALT SERPL W P-5'-P-CCNC: 30 U/L (ref 1–33)
ANION GAP SERPL CALCULATED.3IONS-SCNC: 10.8 MMOL/L (ref 5–15)
AST SERPL-CCNC: 22 U/L (ref 1–32)
BILIRUB SERPL-MCNC: 0.4 MG/DL (ref 0.2–1.2)
BUN BLD-MCNC: 11 MG/DL (ref 6–20)
BUN/CREAT SERPL: 15.1 (ref 7–25)
CALCIUM SPEC-SCNC: 9.3 MG/DL (ref 8.6–10.5)
CHLORIDE SERPL-SCNC: 97 MMOL/L (ref 98–107)
CHOLEST SERPL-MCNC: 248 MG/DL (ref 0–200)
CO2 SERPL-SCNC: 24.2 MMOL/L (ref 22–29)
CREAT BLD-MCNC: 0.73 MG/DL (ref 0.57–1)
GFR SERPL CREATININE-BSD FRML MDRD: 92 ML/MIN/1.73
GLOBULIN UR ELPH-MCNC: 2.8 GM/DL
GLUCOSE BLD-MCNC: 83 MG/DL (ref 65–99)
HBA1C MFR BLD: 5.1 % (ref 3.5–5.6)
HDLC SERPL-MCNC: 51 MG/DL (ref 40–60)
LDLC SERPL CALC-MCNC: 169 MG/DL (ref 0–100)
LDLC/HDLC SERPL: 3.32 {RATIO}
POTASSIUM BLD-SCNC: 4.1 MMOL/L (ref 3.5–5.2)
PROT SERPL-MCNC: 7.5 G/DL (ref 6–8.5)
SODIUM BLD-SCNC: 132 MMOL/L (ref 136–145)
TRIGL SERPL-MCNC: 138 MG/DL (ref 0–150)
TSH SERPL DL<=0.05 MIU/L-ACNC: 0.99 UIU/ML (ref 0.27–4.2)
VLDLC SERPL-MCNC: 27.6 MG/DL (ref 5–40)

## 2020-06-26 PROCEDURE — 82306 VITAMIN D 25 HYDROXY: CPT | Performed by: NURSE PRACTITIONER

## 2020-06-26 PROCEDURE — 84443 ASSAY THYROID STIM HORMONE: CPT | Performed by: NURSE PRACTITIONER

## 2020-06-26 PROCEDURE — 99395 PREV VISIT EST AGE 18-39: CPT | Performed by: NURSE PRACTITIONER

## 2020-06-26 PROCEDURE — 83036 HEMOGLOBIN GLYCOSYLATED A1C: CPT | Performed by: NURSE PRACTITIONER

## 2020-06-26 PROCEDURE — 80053 COMPREHEN METABOLIC PANEL: CPT | Performed by: NURSE PRACTITIONER

## 2020-06-26 PROCEDURE — 36415 COLL VENOUS BLD VENIPUNCTURE: CPT | Performed by: NURSE PRACTITIONER

## 2020-06-26 PROCEDURE — 80061 LIPID PANEL: CPT | Performed by: NURSE PRACTITIONER

## 2020-06-26 RX ORDER — LORATADINE 10 MG/1
10 CAPSULE, LIQUID FILLED ORAL DAILY
Qty: 90 EACH | Refills: 1 | Status: SHIPPED | OUTPATIENT
Start: 2020-06-26 | End: 2021-01-22 | Stop reason: SDUPTHER

## 2020-06-26 RX ORDER — ALBUTEROL SULFATE 90 UG/1
2 AEROSOL, METERED RESPIRATORY (INHALATION) EVERY 4 HOURS PRN
Qty: 1 INHALER | Refills: 6 | Status: SHIPPED | OUTPATIENT
Start: 2020-06-26

## 2020-06-26 RX ORDER — LORATADINE 10 MG/1
10 CAPSULE, LIQUID FILLED ORAL
COMMUNITY
End: 2020-06-26 | Stop reason: SDUPTHER

## 2020-06-26 RX ORDER — IPRATROPIUM BROMIDE AND ALBUTEROL SULFATE 2.5; .5 MG/3ML; MG/3ML
3 SOLUTION RESPIRATORY (INHALATION) DAILY PRN
COMMUNITY

## 2020-06-26 NOTE — PATIENT INSTRUCTIONS
Find a gym, dietician. Consider weight watchers or noom.  continue current exercise program.     Exercising to Lose Weight  Exercise is structured, repetitive physical activity to improve fitness and health. Getting regular exercise is important for everyone. It is especially important if you are overweight. Being overweight increases your risk of heart disease, stroke, diabetes, high blood pressure, and several types of cancer. Reducing your calorie intake and exercising can help you lose weight.  Exercise is usually categorized as moderate or vigorous intensity. To lose weight, most people need to do a certain amount of moderate-intensity or vigorous-intensity exercise each week.  Moderate-intensity exercise    Moderate-intensity exercise is any activity that gets you moving enough to burn at least three times more energy (calories) than if you were sitting.  Examples of moderate exercise include:  · Walking a mile in 15 minutes.  · Doing light yard work.  · Biking at an easy pace.  Most people should get at least 150 minutes (2 hours and 30 minutes) a week of moderate-intensity exercise to maintain their body weight.  Vigorous-intensity exercise  Vigorous-intensity exercise is any activity that gets you moving enough to burn at least six times more calories than if you were sitting. When you exercise at this intensity, you should be working hard enough that you are not able to carry on a conversation.  Examples of vigorous exercise include:  · Running.  · Playing a team sport, such as football, basketball, and soccer.  · Jumping rope.  Most people should get at least 75 minutes (1 hour and 15 minutes) a week of vigorous-intensity exercise to maintain their body weight.  How can exercise affect me?  When you exercise enough to burn more calories than you eat, you lose weight. Exercise also reduces body fat and builds muscle. The more muscle you have, the more calories you burn. Exercise also:  · Improves  mood.  · Reduces stress and tension.  · Improves your overall fitness, flexibility, and endurance.  · Increases bone strength.  The amount of exercise you need to lose weight depends on:  · Your age.  · The type of exercise.  · Any health conditions you have.  · Your overall physical ability.  Talk to your health care provider about how much exercise you need and what types of activities are safe for you.  What actions can I take to lose weight?  Nutrition    · Make changes to your diet as told by your health care provider or diet and nutrition specialist (dietitian). This may include:  ? Eating fewer calories.  ? Eating more protein.  ? Eating less unhealthy fats.  ? Eating a diet that includes fresh fruits and vegetables, whole grains, low-fat dairy products, and lean protein.  ? Avoiding foods with added fat, salt, and sugar.  · Drink plenty of water while you exercise to prevent dehydration or heat stroke.  Activity  · Choose an activity that you enjoy and set realistic goals. Your health care provider can help you make an exercise plan that works for you.  · Exercise at a moderate or vigorous intensity most days of the week.  ? The intensity of exercise may vary from person to person. You can tell how intense a workout is for you by paying attention to your breathing and heartbeat. Most people will notice their breathing and heartbeat get faster with more intense exercise.  · Do resistance training twice each week, such as:  ? Push-ups.  ? Sit-ups.  ? Lifting weights.  ? Using resistance bands.  · Getting short amounts of exercise can be just as helpful as long structured periods of exercise. If you have trouble finding time to exercise, try to include exercise in your daily routine.  ? Get up, stretch, and walk around every 30 minutes throughout the day.  ? Go for a walk during your lunch break.  ? Park your car farther away from your destination.  ? If you take public transportation, get off one stop early  and walk the rest of the way.  ? Make phone calls while standing up and walking around.  ? Take the stairs instead of elevators or escalators.  · Wear comfortable clothes and shoes with good support.  · Do not exercise so much that you hurt yourself, feel dizzy, or get very short of breath.  Where to find more information  · [place] Department of Health and Human Services: www.hhs.gov  · Centers for Disease Control and Prevention (CDC): www.cdc.gov  Contact a health care provider:  · Before starting a new exercise program.  · If you have questions or concerns about your weight.  · If you have a medical problem that keeps you from exercising.  Get help right away if you have any of the following while exercising:  · Injury.  · Dizziness.  · Difficulty breathing or shortness of breath that does not go away when you stop exercising.  · Chest pain.  · Rapid heartbeat.  Summary  · Being overweight increases your risk of heart disease, stroke, diabetes, high blood pressure, and several types of cancer.  · Losing weight happens when you burn more calories than you eat.  · Reducing the amount of calories you eat in addition to getting regular moderate or vigorous exercise each week helps you lose weight.  This information is not intended to replace advice given to you by your health care provider. Make sure you discuss any questions you have with your health care provider.  Document Released: 01/20/2012 Document Revised: 12/31/2018 Document Reviewed: 12/31/2018  [employer]vier Patient Education © 2020 [employer]    Follow up on lab results.

## 2020-06-26 NOTE — PROGRESS NOTES
Subjective   Joann Smith is a 35 y.o. female.     Chief Complaint   Patient presents with   • Hypothyroidism     new patient   • Asthma   • Depression       HPI  Patient is here for management of her chronic medical problems; hypothyroidism, asthma and depression. She is new patient to this office today.     Hypothyroidism: age 20-21 she was diagnosed with hypothyroidism and hyperthyroidism with nontoxic multinodular goiter. She was never prescribed medications. She has not had this checked in several years. She is not taking any medications for this.    Asthma: she has duo neb but not had to use this in past year. May have used inhaler x 1 this year. She was diagnosed as child she is non smoker and not exposed. No hosptializations for this since a child. No symptoms of short of air.     Allergies: she would like refill on claritin this helped her allergy symptoms in the past.  Currently taking benadryl otc this does help.    ADHD: Bipolar DO: taking ritalin 20 mg 3 times a day and is followed by [facility]. She is not on antidepressant. She is not suicidal or homicidal as reported.     The following portions of the patient's history were reviewed and updated as appropriate: allergies, current medications, past family history, past medical history, past social history, past surgical history and problem list.      Current Outpatient Medications:   •  albuterol sulfate  (90 Base) MCG/ACT inhaler, Inhale 2 puffs Every 4 (Four) Hours As Needed for Wheezing., Disp: 1 inhaler, Rfl: 6  •  ipratropium-albuterol (DUO-NEB) 0.5-2.5 mg/3 ml nebulizer, Take 3 mL by nebulization Daily As Needed for Wheezing., Disp: , Rfl:   •  Loratadine 10 MG capsule, Take 10 mg by mouth Daily., Disp: 90 each, Rfl: 1  •  methylphenidate (RITALIN) 20 MG tablet, Take 20 mg by mouth., Disp: , Rfl:     Recent Results (from the past 4032 hour(s))   POC Urinalysis Dipstick, Automated    Collection Time: 01/20/20 11:44 AM   Result  "Value Ref Range    Color Yellow Yellow, Straw, Dark Yellow, Doris    Clarity, UA Cloudy (A) Clear    Specific Gravity  1.015 1.005 - 1.030    pH, Urine 6.5 5.0 - 8.0    Leukocytes Moderate (2+) (A) Negative    Nitrite, UA Negative Negative    Protein, POC 30 mg/dL (A) Negative mg/dL    Glucose, UA Negative Negative, 1000 mg/dL (3+) mg/dL    Ketones, UA Negative Negative    Bilirubin Negative Negative    Blood, UA Moderate (A) Negative         Review of Systems   HENT: Positive for postnasal drip, rhinorrhea and sinus pressure.    Eyes:        Wears contacts. 2019 last eye exam  Not blind not colorblind   Respiratory: Negative for cough and wheezing.         Only cough and wheeze with sinus infection   Cardiovascular: Negative for chest pain, palpitations and leg swelling.   Gastrointestinal: Negative for abdominal pain, anal bleeding, constipation, diarrhea, nausea, GERD and indigestion.   Endocrine: Negative.    Genitourinary: Positive for menstrual problem. Negative for breast lump, breast pain, difficulty urinating, dysuria and hematuria.   Musculoskeletal: Negative.    Skin:        No skin cancers. Skin tags. No rashes   Neurological: Negative for tremors, seizures, syncope and headache.   Hematological: Negative.    Psychiatric/Behavioral: Negative for hallucinations, sleep disturbance, suicidal ideas and depressed mood. The patient is not nervous/anxious.         ADHD and Bipolar       Objective     /90 (BP Location: Left arm, Patient Position: Sitting, Cuff Size: Large Adult)   Pulse 90   Resp 20   Ht 165.1 cm (65\")   Wt (!) 152 kg (335 lb 12.8 oz)   SpO2 98%   BMI 55.88 kg/m²     Physical Exam   Constitutional: She is oriented to person, place, and time. She appears well-developed and well-nourished.   HENT:   Head: Normocephalic and atraumatic.   Right Ear: External ear normal.   Left Ear: External ear normal.   Nose: Nose normal.   Mouth/Throat: Oropharynx is clear and moist. No oropharyngeal " exudate.   Eyes: Pupils are equal, round, and reactive to light. Conjunctivae are normal.   Neck: Normal range of motion. Neck supple. No tracheal deviation present. No thyromegaly present.   Cardiovascular: Normal rate, regular rhythm, normal heart sounds and intact distal pulses.   Pulmonary/Chest: Effort normal and breath sounds normal.   Abdominal: Soft. Bowel sounds are normal.   Musculoskeletal: Normal range of motion.   Neurological: She is alert and oriented to person, place, and time.   Skin: Skin is warm and dry.   Psychiatric: She has a normal mood and affect. Her behavior is normal. Judgment and thought content normal.   Nursing note and vitals reviewed.        Assessment/Plan   Joann was seen today for hypothyroidism, asthma and depression.    Diagnoses and all orders for this visit:    Mild intermittent asthma without complication  Comments:  stable    Class 3 severe obesity with serious comorbidity and body mass index (BMI) of 50.0 to 59.9 in adult, unspecified obesity type (CMS/[facility])  Comments:  life style changes discussed today. exercise discussed today.   Orders:  -     Lipid Panel  -     Comprehensive Metabolic Panel  -     Hemoglobin A1c  -     Vitamin D 25 Hydroxy  -     TSH    Hypothyroidism, unspecified type  Comments:  testing ordered    Nontoxic multinodular goiter  Comments:  stable    Encounter for health-related screening  Comments:  routine labs ordered.     Other orders  -     albuterol sulfate  (90 Base) MCG/ACT inhaler; Inhale 2 puffs Every 4 (Four) Hours As Needed for Wheezing.  -     Loratadine 10 MG capsule; Take 10 mg by mouth Daily.      Patient Instructions   Find a gym, dietician. Consider weight watchers or noom.  continue current exercise program.     Exercising to Lose Weight  Exercise is structured, repetitive physical activity to improve fitness and health. Getting regular exercise is important for everyone. It is especially important if you are overweight. Being  overweight increases your risk of heart disease, stroke, diabetes, high blood pressure, and several types of cancer. Reducing your calorie intake and exercising can help you lose weight.  Exercise is usually categorized as moderate or vigorous intensity. To lose weight, most people need to do a certain amount of moderate-intensity or vigorous-intensity exercise each week.  Moderate-intensity exercise    Moderate-intensity exercise is any activity that gets you moving enough to burn at least three times more energy (calories) than if you were sitting.  Examples of moderate exercise include:  · Walking a mile in 15 minutes.  · Doing light yard work.  · Biking at an easy pace.  Most people should get at least 150 minutes (2 hours and 30 minutes) a week of moderate-intensity exercise to maintain their body weight.  Vigorous-intensity exercise  Vigorous-intensity exercise is any activity that gets you moving enough to burn at least six times more calories than if you were sitting. When you exercise at this intensity, you should be working hard enough that you are not able to carry on a conversation.  Examples of vigorous exercise include:  · Running.  · Playing a team sport, such as football, basketball, and soccer.  · Jumping rope.  Most people should get at least 75 minutes (1 hour and 15 minutes) a week of vigorous-intensity exercise to maintain their body weight.  How can exercise affect me?  When you exercise enough to burn more calories than you eat, you lose weight. Exercise also reduces body fat and builds muscle. The more muscle you have, the more calories you burn. Exercise also:  · Improves mood.  · Reduces stress and tension.  · Improves your overall fitness, flexibility, and endurance.  · Increases bone strength.  The amount of exercise you need to lose weight depends on:  · Your age.  · The type of exercise.  · Any health conditions you have.  · Your overall physical ability.  Talk to your health care  provider about how much exercise you need and what types of activities are safe for you.  What actions can I take to lose weight?  Nutrition    · Make changes to your diet as told by your health care provider or diet and nutrition specialist (dietitian). This may include:  ? Eating fewer calories.  ? Eating more protein.  ? Eating less unhealthy fats.  ? Eating a diet that includes fresh fruits and vegetables, whole grains, low-fat dairy products, and lean protein.  ? Avoiding foods with added fat, salt, and sugar.  · Drink plenty of water while you exercise to prevent dehydration or heat stroke.  Activity  · Choose an activity that you enjoy and set realistic goals. Your health care provider can help you make an exercise plan that works for you.  · Exercise at a moderate or vigorous intensity most days of the week.  ? The intensity of exercise may vary from person to person. You can tell how intense a workout is for you by paying attention to your breathing and heartbeat. Most people will notice their breathing and heartbeat get faster with more intense exercise.  · Do resistance training twice each week, such as:  ? Push-ups.  ? Sit-ups.  ? Lifting weights.  ? Using resistance bands.  · Getting short amounts of exercise can be just as helpful as long structured periods of exercise. If you have trouble finding time to exercise, try to include exercise in your daily routine.  ? Get up, stretch, and walk around every 30 minutes throughout the day.  ? Go for a walk during your lunch break.  ? Park your car farther away from your destination.  ? If you take public transportation, get off one stop early and walk the rest of the way.  ? Make phone calls while standing up and walking around.  ? Take the stairs instead of elevators or escalators.  · Wear comfortable clothes and shoes with good support.  · Do not exercise so much that you hurt yourself, feel dizzy, or get very short of breath.  Where to find more  information  · [place] Department of Health and Human Services: www.hhs.gov  · Centers for Disease Control and Prevention (CDC): www.cdc.gov  Contact a health care provider:  · Before starting a new exercise program.  · If you have questions or concerns about your weight.  · If you have a medical problem that keeps you from exercising.  Get help right away if you have any of the following while exercising:  · Injury.  · Dizziness.  · Difficulty breathing or shortness of breath that does not go away when you stop exercising.  · Chest pain.  · Rapid heartbeat.  Summary  · Being overweight increases your risk of heart disease, stroke, diabetes, high blood pressure, and several types of cancer.  · Losing weight happens when you burn more calories than you eat.  · Reducing the amount of calories you eat in addition to getting regular moderate or vigorous exercise each week helps you lose weight.  This information is not intended to replace advice given to you by your health care provider. Make sure you discuss any questions you have with your health care provider.  Document Released: 01/20/2012 Document Revised: 12/31/2018 Document Reviewed: 12/31/2018  [employer] Patient Education © 2020 [employer]    Follow up on lab results.         ZULMA Locke    06/26/20      Answers for HPI/ROS submitted by the patient on 6/24/2020   What is the primary reason for your visit?: Physical

## 2020-08-11 DIAGNOSIS — Z20.822 CLOSE EXPOSURE TO COVID-19 VIRUS: Primary | ICD-10-CM

## 2020-08-11 PROCEDURE — U0003 INFECTIOUS AGENT DETECTION BY NUCLEIC ACID (DNA OR RNA); SEVERE ACUTE RESPIRATORY SYNDROME CORONAVIRUS 2 (SARS-COV-2) (CORONAVIRUS DISEASE [COVID-19]), AMPLIFIED PROBE TECHNIQUE, MAKING USE OF HIGH THROUGHPUT TECHNOLOGIES AS DESCRIBED BY CMS-2020-01-R: HCPCS | Performed by: NURSE PRACTITIONER

## 2020-09-01 ENCOUNTER — TELEPHONE (OUTPATIENT)
Dept: FAMILY MEDICINE CLINIC | Facility: CLINIC | Age: 35
End: 2020-09-01

## 2020-11-17 ENCOUNTER — OFFICE VISIT (OUTPATIENT)
Dept: FAMILY MEDICINE CLINIC | Facility: CLINIC | Age: 35
End: 2020-11-17

## 2020-11-17 VITALS
RESPIRATION RATE: 22 BRPM | WEIGHT: 293 LBS | BODY MASS INDEX: 48.82 KG/M2 | HEIGHT: 65 IN | TEMPERATURE: 97.2 F | DIASTOLIC BLOOD PRESSURE: 84 MMHG | SYSTOLIC BLOOD PRESSURE: 135 MMHG | OXYGEN SATURATION: 97 % | HEART RATE: 105 BPM

## 2020-11-17 DIAGNOSIS — J32.0 CHRONIC MAXILLARY SINUSITIS: Primary | ICD-10-CM

## 2020-11-17 PROCEDURE — 99213 OFFICE O/P EST LOW 20 MIN: CPT | Performed by: NURSE PRACTITIONER

## 2020-11-17 RX ORDER — FLUCONAZOLE 150 MG/1
150 TABLET ORAL ONCE
Qty: 1 TABLET | Refills: 0 | Status: SHIPPED | OUTPATIENT
Start: 2020-11-17 | End: 2020-11-17

## 2020-11-17 RX ORDER — CEFDINIR 300 MG/1
300 CAPSULE ORAL 2 TIMES DAILY
Qty: 14 CAPSULE | Refills: 0 | Status: SHIPPED | OUTPATIENT
Start: 2020-11-17 | End: 2021-01-22

## 2020-11-17 RX ORDER — FLUTICASONE PROPIONATE 50 MCG
2 SPRAY, SUSPENSION (ML) NASAL DAILY
Qty: 1 BOTTLE | Refills: 3 | Status: SHIPPED | OUTPATIENT
Start: 2020-11-17

## 2020-11-17 NOTE — PATIENT INSTRUCTIONS
Take antibioitics use flonase don't get ears wet.   Follow up if symptoms worsen take allergy medication daily

## 2020-11-17 NOTE — PROGRESS NOTES
"Subjective   Joann Smith is a 35 y.o. female.     Chief Complaint   Patient presents with   • Earache     BILATERAL SINCE FRIDAY. WORSE AT NIGHT. SAYS \"THROAT\" DRAINAGE.        HPI  Patient is here for earache started left ear Friday it hurst most now both hurt. No fever has sinus drng.   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   Taking ibuprofen and using heating pad.     The following portions of the patient's history were reviewed and updated as appropriate: allergies, current medications, past family history, past medical history, past social history, past surgical history and problem list.      Current Outpatient Medications:   •  albuterol sulfate  (90 Base) MCG/ACT inhaler, Inhale 2 puffs Every 4 (Four) Hours As Needed for Wheezing., Disp: 1 inhaler, Rfl: 6  •  cholecalciferol (VITAMIN D3) 25 MCG (1000 UT) tablet, Take 1,000 Units by mouth Daily., Disp: , Rfl:   •  ipratropium-albuterol (DUO-NEB) 0.5-2.5 mg/3 ml nebulizer, Take 3 mL by nebulization Daily As Needed for Wheezing., Disp: , Rfl:   •  Loratadine 10 MG capsule, Take 10 mg by mouth Daily., Disp: 90 each, Rfl: 1  •  methylphenidate (RITALIN) 20 MG tablet, Take 20 mg by mouth., Disp: , Rfl:   •  Pediatric Multivit-Minerals-C (SMARTY PANTS KIDS COMPLETE PO), Take  by mouth., Disp: , Rfl:   •  cefdinir (OMNICEF) 300 MG capsule, Take 1 capsule by mouth 2 (Two) Times a Day., Disp: 14 capsule, Rfl: 0  •  fluconazole (Diflucan) 150 MG tablet, Take 1 tablet by mouth 1 (One) Time for 1 dose., Disp: 1 tablet, Rfl: 0  •  fluticasone (Flonase) 50 MCG/ACT nasal spray, 2 sprays into the nostril(s) as directed by provider Daily., Disp: 1 bottle, Rfl: 3    Recent Results (from the past 4032 hour(s))   Lipid Panel    Collection Time: 06/26/20  3:16 PM    Specimen: Blood   Result Value Ref Range    Total Cholesterol 248 (H) 0 - 200 mg/dL    Triglycerides 138 0 - 150 mg/dL    HDL Cholesterol 51 40 - 60 mg/dL    LDL Cholesterol  169 (H) 0 - " 100 mg/dL    VLDL Cholesterol 27.6 5 - 40 mg/dL    LDL/HDL Ratio 3.32    Comprehensive Metabolic Panel    Collection Time: 06/26/20  3:16 PM    Specimen: Blood   Result Value Ref Range    Glucose 83 65 - 99 mg/dL    BUN 11 6 - 20 mg/dL    Creatinine 0.73 0.57 - 1.00 mg/dL    Sodium 132 (L) 136 - 145 mmol/L    Potassium 4.1 3.5 - 5.2 mmol/L    Chloride 97 (L) 98 - 107 mmol/L    CO2 24.2 22.0 - 29.0 mmol/L    Calcium 9.3 8.6 - 10.5 mg/dL    Total Protein 7.5 6.0 - 8.5 g/dL    Albumin 4.70 3.50 - 5.20 g/dL    ALT (SGPT) 30 1 - 33 U/L    AST (SGOT) 22 1 - 32 U/L    Alkaline Phosphatase 65 39 - 117 U/L    Total Bilirubin 0.4 0.2 - 1.2 mg/dL    eGFR Non African Amer 92 >60 mL/min/1.73    Globulin 2.8 gm/dL    A/G Ratio 1.7 g/dL    BUN/Creatinine Ratio 15.1 7.0 - 25.0    Anion Gap 10.8 5.0 - 15.0 mmol/L   Hemoglobin A1c    Collection Time: 06/26/20  3:16 PM    Specimen: Blood   Result Value Ref Range    Hemoglobin A1C 5.1 3.5 - 5.6 %   Vitamin D 25 Hydroxy    Collection Time: 06/26/20  3:16 PM    Specimen: Blood   Result Value Ref Range    25 Hydroxy, Vitamin D 16.6 (L) 30.0 - 100.0 ng/ml   TSH    Collection Time: 06/26/20  3:16 PM    Specimen: Blood   Result Value Ref Range    TSH 0.987 0.270 - 4.200 uIU/mL   COVID-19,[facility] ROUTINE, NP/OP SWAB IN TRANSPORT MEDIA OR ESWAB 72 HR TAT - Swab, Nasopharynx    Collection Time: 08/11/20  4:52 PM    Specimen: Nasopharynx; Swab   Result Value Ref Range    SARS-CoV-2, PERI Not Detected Not Detected   POC Urinalysis Dipstick, Automated    Collection Time: 08/30/20  2:30 PM    Specimen: Urine   Result Value Ref Range    Color Yellow Yellow, Straw, Dark Yellow, Doris    Clarity, UA Clear Clear    Specific Gravity  1.025 1.005 - 1.030    pH, Urine 5.5 5.0 - 8.0    Leukocytes Negative Negative    Nitrite, UA Negative Negative    Protein, POC Negative Negative mg/dL    Glucose, UA Negative Negative, 1000 mg/dL (3+) mg/dL    Ketones, UA Negative Negative    Bilirubin Negative Negative     "Blood, UA Trace (A) Negative   POCT Pregnancy, urine    Collection Time: 08/30/20  2:32 PM    Specimen: Urine   Result Value Ref Range    HCG, Urine, QL Negative Negative    Lot Number [id number]     Internal Positive Control Positive     Internal Negative Control Negative          Review of Systems   Constitutional: Negative for fever.   HENT: Positive for ear pain, rhinorrhea and sore throat.    Neurological: Negative for seizures and headache.       Objective     /84 (BP Location: Left arm, Patient Position: Sitting, Cuff Size: Large Adult)   Pulse 105   Temp 97.2 °F (36.2 °C) (Temporal)   Resp 22   Ht 165.1 cm (65\")   Wt (!) 149 kg (328 lb 12.8 oz)   SpO2 97%   BMI 54.72 kg/m²     Physical Exam  Constitutional:       Appearance: Normal appearance.   HENT:      Head: Normocephalic and atraumatic.      Right Ear: Hearing and external ear normal. Tenderness present. A middle ear effusion is present. Tympanic membrane is scarred.      Left Ear: Hearing and external ear normal. Tenderness present. A middle ear effusion is present. Tympanic membrane is scarred.      Nose: Congestion and rhinorrhea present.      Comments: Turbinates red and swollen boggy     Mouth/Throat:      Mouth: Mucous membranes are moist.   Pulmonary:      Effort: Pulmonary effort is normal.      Breath sounds: Normal breath sounds and air entry.   Neurological:      Mental Status: She is alert.           Assessment/Plan   Diagnoses and all orders for this visit:    1. Chronic maxillary sinusitis (Primary)  Comments:  use flonase. take cefdinir.     Other orders  -     fluticasone (Flonase) 50 MCG/ACT nasal spray; 2 sprays into the nostril(s) as directed by provider Daily.  Dispense: 1 bottle; Refill: 3  -     cefdinir (OMNICEF) 300 MG capsule; Take 1 capsule by mouth 2 (Two) Times a Day.  Dispense: 14 capsule; Refill: 0  -     fluconazole (Diflucan) 150 MG tablet; Take 1 tablet by mouth 1 (One) Time for 1 dose.  Dispense: 1 tablet; " Refill: 0      Patient Instructions   Take antibioitics use flonase don't get ears wet.   Follow up if symptoms worsen take allergy medication daily      Negar Pérez, APRN    11/17/20      Answers for HPI/ROS submitted by the patient on 11/17/2020   Ear pain  What is the primary reason for your visit?: Ear Pain

## 2020-11-30 ENCOUNTER — OFFICE VISIT (OUTPATIENT)
Dept: ORTHOPEDIC SURGERY | Facility: CLINIC | Age: 35
End: 2020-11-30

## 2020-11-30 VITALS — WEIGHT: 293 LBS | HEIGHT: 65 IN | BODY MASS INDEX: 48.82 KG/M2 | TEMPERATURE: 96.3 F

## 2020-11-30 DIAGNOSIS — S93.502A SPRAIN OF LEFT GREAT TOE, INITIAL ENCOUNTER: ICD-10-CM

## 2020-11-30 DIAGNOSIS — S99.922A FOOT INJURY, LEFT, INITIAL ENCOUNTER: Primary | ICD-10-CM

## 2020-11-30 DIAGNOSIS — M77.42 METATARSALGIA, LEFT FOOT: ICD-10-CM

## 2020-11-30 PROCEDURE — 73630 X-RAY EXAM OF FOOT: CPT | Performed by: NURSE PRACTITIONER

## 2020-11-30 PROCEDURE — 99213 OFFICE O/P EST LOW 20 MIN: CPT | Performed by: NURSE PRACTITIONER

## 2020-11-30 NOTE — PROGRESS NOTES
Patient Name: Joann Smith   YOB: 1985  Referring Primary Care Physician: Negar Pérez APRN  BMI: Body mass index is 54.25 kg/m².    Chief Complaint:    Chief Complaint   Patient presents with   • Left Foot - Pain        HPI: Pt fell on Thanksgiving walking down stairs and fell forward barefoot. Pt went to [facility] and sent here for follow up. LMP 2 weeks ago.  She is leaving to go to [place] December 15 and is a prior patient of Dr. Tinoco.  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    Joann Smith is a 35 y.o. female who presents today for evaluation of   Chief Complaint   Patient presents with   • Left Foot - Pain         Subjective   Medications:   Home Medications:  Current Outpatient Medications on File Prior to Visit   Medication Sig   • albuterol (PROVENTIL) (5 MG/ML) 0.5% nebulizer solution Take 2.5 mg by nebulization As Needed for Wheezing.   • albuterol sulfate  (90 Base) MCG/ACT inhaler Inhale 2 puffs Every 4 (Four) Hours As Needed for Wheezing.   • cholecalciferol (VITAMIN D3) 25 MCG (1000 UT) tablet Take 1,000 Units by mouth Daily.   • Loratadine 10 MG capsule Take 10 mg by mouth Daily.   • methylphenidate (RITALIN) 20 MG tablet Take 20 mg by mouth.   • Prenatal Vit-Fe Fumarate-FA (PRENATAL VITAMIN PO) Take  by mouth. Smarty Pants Prenatal Vit   • cefdinir (OMNICEF) 300 MG capsule Take 1 capsule by mouth 2 (Two) Times a Day.   • fluticasone (Flonase) 50 MCG/ACT nasal spray 2 sprays into the nostril(s) as directed by provider Daily.   • ipratropium-albuterol (DUO-NEB) 0.5-2.5 mg/3 ml nebulizer Take 3 mL by nebulization Daily As Needed for Wheezing.   • Pediatric Multivit-Minerals-C (SMARTY PANTS KIDS COMPLETE PO) Take  by mouth.     No current facility-administered medications on file prior to visit.      Current Medications:  Scheduled Meds:  Continuous Infusions:No current facility-administered medications for this visit.     PRN Meds:.    I have  reviewed the patient's medical history in detail and updated the computerized patient record.  Review and summarization of old records includes:    Past Medical History:   Diagnosis Date   • Allergic    • Arthritis of foot 5/9/2019   • Asthma    • Bone cyst 1988 or 1989    I had a bone cyst removal on my right middle fingerm   • Chronic ethmoidal sinusitis 2/25/2008   • Depression    • Fractures Various    Hairlines in my right wrist and both ankles   • Hernia, hiatal         Past Surgical History:   Procedure Laterality Date   • BONE CYST EXCISION      right hand knuckle, middle finger   • SINUS SURGERY     • TONSILLECTOMY     • WISDOM TOOTH EXTRACTION          Social History     Occupational History   • Not on file   Tobacco Use   • Smoking status: Never Smoker   • Smokeless tobacco: Never Used   Substance and Sexual Activity   • Alcohol use: No     Frequency: Never   • Drug use: No   • Sexual activity: Yes     Partners: Male     Birth control/protection: None      Social History     Social History Narrative   • Not on file        Family History   Problem Relation Age of Onset   • Hypertension Father    • Cancer Father         thyroid   • Diabetes Paternal Aunt    • Diabetes Paternal Uncle    • Diabetes Paternal Grandmother    • Cancer Paternal Grandmother         brain       ROS: 14 point review of systems was performed and all other systems were reviewed and are negative except for documented findings in HPI and today's encounter.     Allergies:   Allergies   Allergen Reactions   • Vancomycin Unknown - High Severity     Pt. Reports getting edmar's syndrome & that she passed out    • Latex Rash   • Nickel Rash   • Penicillins Rash     Constitutional:  Denies fever, shaking or chills   Eyes:  Denies change in visual acuity   HENT:  Denies nasal congestion or sore throat   Respiratory:  Denies cough or shortness of breath   Cardiovascular:  Denies chest pain or severe LE edema   GI:  Denies abdominal pain, nausea,  "vomiting, bloody stools or diarrhea   Musculoskeletal:  Numbness, tingling, pain, or loss of motor function only as noted above in history of present illness.  : Denies painful urination or hematuria  Integument:  Denies rash, lesion or ulceration   Neurologic:  Denies headache or focal weakness  Endocrine:  Denies lymphadenopathy  Psych:  Denies confusion or change in mental status   Hem:  Denies active bleeding    OBJECTIVE:  Physical Exam: 35 y.o. female  Wt Readings from Last 3 Encounters:   11/30/20 (!) 148 kg (326 lb)   11/17/20 (!) 149 kg (328 lb 12.8 oz)   08/30/20 (!) 148 kg (326 lb)     Ht Readings from Last 1 Encounters:   11/30/20 165.1 cm (65\")     Body mass index is 54.25 kg/m².  Vitals:    11/30/20 1021   Temp: 96.3 °F (35.7 °C)     Vital signs reviewed.     General Appearance:    Alert, cooperative, in no acute distress                  Eyes: conjunctiva clear  ENT: external ears and nose atraumatic  CV: no peripheral edema  Resp: normal respiratory effort  Skin: no rashes or wounds; normal turgor  Psych: mood and affect appropriate  Lymph: no nodes appreciated  Neuro: gross sensation intact  Vascular:  Palpable peripheral pulse in noted extremity  Musculoskeletal Extremities: Skin is warm dry intact with good pulses and sensation Achilles is intact she has ecchymosis to the first metatarsal and extending to the second and third no tenderness to the top of the foot she is point tender to the first MTP ankles nontender base of fifth metatarsals nontender    Radiology:   X-rays of left foot 3 views were done today for pain no comparison views readily available no acute fracture is identified on plain film  Assessment:     ICD-10-CM ICD-9-CM   1. Foot injury, left, initial encounter  S99.922A 959.7   2. Metatarsalgia, left foot  M77.42 726.70   3. Sprain of left great toe, initial encounter  S93.502A 845.10        Procedures  We will have her follow-up with Dr. Huynh prior to leaving for [place] " put her in a fracture walker boot rest ice elevation NSAIDs and if is not improved may consider further imaging with Dr. Huynh  Plan: The diagnosis(es), natural history, pathophysiology and treatment for diagnosis(es) were discussed. Opportunity given and questions answered.  Biomechanics of pertinent body areas discussed.  When appropriate, the use of ambulatory aids discussed.  BMI:  The concept of BMI body mass index and its importance and implications discussed.    MEDICATIONS:  The risks, benefits, warnings,side effects and alternatives of medications discussed.  Inflammation/pain control; with cold, heat, elevation and/or liniments discussed as appropriate  MEDICAL RECORDS reviewed from other provider(s) for past and current medical history pertinent to this complaint.      11/30/2020    Much of this encounter note is an electronic transcription/translation of spoken language to printed text. The electronic translation of spoken language may permit erroneous, or at times, nonsensical words or phrases to be inadvertently transcribed; Although I have reviewed the note for such errors, some may still exist

## 2020-12-09 ENCOUNTER — OFFICE VISIT (OUTPATIENT)
Dept: ORTHOPEDIC SURGERY | Facility: CLINIC | Age: 35
End: 2020-12-09

## 2020-12-09 VITALS — BODY MASS INDEX: 48.82 KG/M2 | WEIGHT: 293 LBS | HEIGHT: 65 IN | TEMPERATURE: 97.7 F

## 2020-12-09 DIAGNOSIS — S93.502A SPRAIN OF LEFT GREAT TOE, INITIAL ENCOUNTER: ICD-10-CM

## 2020-12-09 DIAGNOSIS — S99.922A FOOT INJURY, LEFT, INITIAL ENCOUNTER: ICD-10-CM

## 2020-12-09 DIAGNOSIS — R52 PAIN: Primary | ICD-10-CM

## 2020-12-09 PROCEDURE — 73630 X-RAY EXAM OF FOOT: CPT | Performed by: ORTHOPAEDIC SURGERY

## 2020-12-09 PROCEDURE — 99213 OFFICE O/P EST LOW 20 MIN: CPT | Performed by: ORTHOPAEDIC SURGERY

## 2020-12-09 NOTE — PROGRESS NOTES
"Foot Follow Up      Patient: Joann Smith    YOB: 1985 35 y.o. female    Chief Complaints: I hurt my foot    History of Present Illness: Patient sustained injury to her left foot approximately 2 weeks ago on Thanksgiving when she was coming down some steps and slipped and sustained some of an axial load her first and second toes.    She subsequently saw Poonam on 11/30/2020 and has been in a boot and reports the pain has improved to some mild aching pain along the first toe and first MTP joint more so than second toe nontender about the ankle or midfoot.    Her main concern is that she is leaving in a week to go to [place] for 3 weeks and then to [facility] to make sure things are getting better.    Have seen her most recently in October 2019 for her knee which she did not mention today.  HPI    ROS: Foot pain  Past Medical History:   Diagnosis Date   • Allergic    • Arthritis of foot 5/9/2019   • Asthma    • Bone cyst 1988 or 1989    I had a bone cyst removal on my right middle fingerm   • Chronic ethmoidal sinusitis 2/25/2008   • Depression    • Fractures Various    Hairlines in my right wrist and both ankles   • Hernia, hiatal      Physical Exam:   Vitals:    12/09/20 1003   Temp: 97.7 °F (36.5 °C)   Weight: (!) 148 kg (326 lb)   Height: 165.1 cm (65\")   PainSc:   5     Well developed with normal mood.  Left first and second toe show some resolving ecchymosis mild discomfort along the first toe and first MTP joint more so than the second toe base with there was some ecchymosis.  She was able to flex and extend the first toe without extensor or flexor lag and nontender with dorsum of the midfoot.      Radiology: 3 views of the left foot ordered evaluate for fracture reviewed and compared with prior x-rays from 11/30/2020.  She has a bipartite lateral sesamoid without change as well as a chronic ossification plantar medial to the second metatarsal head and fifth consistent with some sesamoids " I do not see any obvious fracture of the first or second toe or metatarsals mild arthritic change at the midfoot but no malalignment.      Assessment/Plan: 1.  Left foot injury with probable contusion/sprain of the first more so than second toes.    Reviewed with her that I will see any evidence of tendon injury and there could be an occult fracture but I will see the on x-rays and it would not change our treatment protocol at this point as she is improving we will have her start coming out of the boot and use a postoperative shoe for another 10 to 14 days and she said that she is still been doing some water exercises and the resistance hurts her toe so she has been to buddy tape these.    If she continues to improve in the next 2 weeks she may start weaning out of the postoperative shoe into an athletic shoe and if symptoms persist or worsen to let me know otherwise I will see her back as needed

## 2021-01-22 ENCOUNTER — TELEPHONE (OUTPATIENT)
Dept: FAMILY MEDICINE CLINIC | Facility: CLINIC | Age: 36
End: 2021-01-22

## 2021-01-22 ENCOUNTER — OFFICE VISIT (OUTPATIENT)
Dept: FAMILY MEDICINE CLINIC | Facility: CLINIC | Age: 36
End: 2021-01-22

## 2021-01-22 VITALS
HEIGHT: 65 IN | HEART RATE: 68 BPM | TEMPERATURE: 96.9 F | DIASTOLIC BLOOD PRESSURE: 89 MMHG | BODY MASS INDEX: 48.82 KG/M2 | WEIGHT: 293 LBS | SYSTOLIC BLOOD PRESSURE: 137 MMHG | RESPIRATION RATE: 20 BRPM | OXYGEN SATURATION: 100 %

## 2021-01-22 DIAGNOSIS — J45.20 MILD INTERMITTENT ASTHMA WITHOUT COMPLICATION: ICD-10-CM

## 2021-01-22 DIAGNOSIS — F90.2 ATTENTION DEFICIT HYPERACTIVITY DISORDER (ADHD), COMBINED TYPE: Primary | ICD-10-CM

## 2021-01-22 DIAGNOSIS — E66.01 CLASS 3 SEVERE OBESITY WITH SERIOUS COMORBIDITY AND BODY MASS INDEX (BMI) OF 50.0 TO 59.9 IN ADULT, UNSPECIFIED OBESITY TYPE (HCC): ICD-10-CM

## 2021-01-22 DIAGNOSIS — E55.9 VITAMIN D DEFICIENCY: ICD-10-CM

## 2021-01-22 PROCEDURE — 99213 OFFICE O/P EST LOW 20 MIN: CPT | Performed by: NURSE PRACTITIONER

## 2021-01-22 RX ORDER — LORATADINE 10 MG/1
10 CAPSULE, LIQUID FILLED ORAL DAILY
Qty: 90 EACH | Refills: 1 | Status: SHIPPED | OUTPATIENT
Start: 2021-01-22 | End: 2021-07-06 | Stop reason: SDUPTHER

## 2021-01-22 NOTE — PROGRESS NOTES
Subjective        Joann Smith is a 35 y.o. female.     Chief Complaint   Patient presents with   • Hypothyroidism     6 month follow up    • Heartburn   • Acne   • Asthma   • Chronic Sinusitis     Answers for HPI/ROS submitted by the patient on 1/21/2021   What is the primary reason for your visit?: Physical    History of Present Illness   Patient is here for management of her chronic medical problems:   Gerds, ADHD, allergies, vit d def, asthma.obesity.    Asthma: no acute exacerbations she reports using nebulizer and inhaler very infrequently. Has allergies, stable.    Allergies: using flonase daily taking loratadine daily has duo nebs and albuterol as needed. She does have intermittent sinus infections.     Vit d def; taking vit d 1000 IU with food.     Obesity: she continues to work out as reported. She tries to eat healthy.          The following portions of the patient's history were reviewed and updated as appropriate: allergies, current medications, past family history, past medical history, past social history, past surgical history and problem list.      Current Outpatient Medications:   •  albuterol (PROVENTIL) (5 MG/ML) 0.5% nebulizer solution, Take 2.5 mg by nebulization As Needed for Wheezing., Disp: , Rfl:   •  albuterol sulfate  (90 Base) MCG/ACT inhaler, Inhale 2 puffs Every 4 (Four) Hours As Needed for Wheezing., Disp: 1 inhaler, Rfl: 6  •  cholecalciferol (VITAMIN D3) 25 MCG (1000 UT) tablet, Take 1,000 Units by mouth Daily., Disp: , Rfl:   •  fluticasone (Flonase) 50 MCG/ACT nasal spray, 2 sprays into the nostril(s) as directed by provider Daily., Disp: 1 bottle, Rfl: 3  •  ipratropium-albuterol (DUO-NEB) 0.5-2.5 mg/3 ml nebulizer, Take 3 mL by nebulization Daily As Needed for Wheezing., Disp: , Rfl:   •  Loratadine 10 MG capsule, Take 10 mg by mouth Daily., Disp: 90 each, Rfl: 1  •  methylphenidate (RITALIN) 20 MG tablet, Take 20 mg by mouth., Disp: , Rfl:   •  Pediatric  "Multivit-Minerals-C (SMARTY PANTS KIDS COMPLETE PO), Take  by mouth., Disp: , Rfl:   •  Prenatal Vit-Fe Fumarate-FA (PRENATAL VITAMIN PO), Take  by mouth. Smarty Pants Prenatal Vit, Disp: , Rfl:     Recent Results (from the past 4032 hour(s))   COVID-19,[facility] ROUTINE, NP/OP SWAB IN TRANSPORT MEDIA OR ESWAB 72 HR TAT - Swab, Nasopharynx    Collection Time: 08/11/20  4:52 PM    Specimen: Nasopharynx; Swab   Result Value Ref Range    SARS-CoV-2, PERI Not Detected Not Detected   POC Urinalysis Dipstick, Automated    Collection Time: 08/30/20  2:30 PM    Specimen: Urine   Result Value Ref Range    Color Yellow Yellow, Straw, Dark Yellow, Doris    Clarity, UA Clear Clear    Specific Gravity  1.025 1.005 - 1.030    pH, Urine 5.5 5.0 - 8.0    Leukocytes Negative Negative    Nitrite, UA Negative Negative    Protein, POC Negative Negative mg/dL    Glucose, UA Negative Negative, 1000 mg/dL (3+) mg/dL    Ketones, UA Negative Negative    Bilirubin Negative Negative    Blood, UA Trace (A) Negative   POCT Pregnancy, urine    Collection Time: 08/30/20  2:32 PM    Specimen: Urine   Result Value Ref Range    HCG, Urine, QL Negative Negative    Lot Number [id number]     Internal Positive Control Positive     Internal Negative Control Negative          Review of Systems    Objective     /89 (BP Location: Left arm, Patient Position: Sitting, Cuff Size: Large Adult)   Pulse 68   Temp 96.9 °F (36.1 °C) (Temporal)   Resp 20   Ht 165.1 cm (65\")   Wt (!) 152 kg (335 lb 3.2 oz)   SpO2 100%   BMI 55.78 kg/m²     Physical Exam  Vitals signs and nursing note reviewed.   Constitutional:       Appearance: Normal appearance.   HENT:      Head: Normocephalic.      Right Ear: Tympanic membrane normal.      Left Ear: Tympanic membrane normal.      Nose: Nose normal.      Mouth/Throat:      Mouth: Mucous membranes are dry.   Eyes:      Pupils: Pupils are equal, round, and reactive to light.   Neck:      Musculoskeletal: Normal range of " motion and neck supple.   Cardiovascular:      Rate and Rhythm: Normal rate and regular rhythm.      Pulses: Normal pulses.      Heart sounds: Normal heart sounds.   Pulmonary:      Effort: Pulmonary effort is normal.      Breath sounds: Normal breath sounds.   Abdominal:      General: Bowel sounds are normal.      Palpations: Abdomen is soft.   Musculoskeletal: Normal range of motion.   Skin:     General: Skin is warm and dry.      Capillary Refill: Capillary refill takes less than 2 seconds.   Neurological:      General: No focal deficit present.      Mental Status: She is alert and oriented to person, place, and time.   Psychiatric:         Mood and Affect: Mood normal.         Behavior: Behavior normal.         Thought Content: Thought content normal.         Judgment: Judgment normal.         Result Review :                Assessment/Plan    Diagnoses and all orders for this visit:    1. Attention deficit hyperactivity disorder (ADHD), combined type (Primary)  Comments:  stable    2. Mild intermittent asthma without complication  Comments:  stable    3. Class 3 severe obesity with serious comorbidity and body mass index (BMI) of 50.0 to 59.9 in adult, unspecified obesity type (CMS/HCC)  Comments:  she continues to work out daily. she is making needed life style changes    4. Vitamin D deficiency  Comments:  stable. she continues to take vit d      There are no Patient Instructions on file for this visit.    Follow Up   No follow-ups on file.    Patient was given instructions and counseling regarding her condition or for health maintenance advice. Please see specific information pulled into the AVS if appropriate.     Negar Pérez, APRN    01/22/21

## 2021-03-03 DIAGNOSIS — H92.03 OTALGIA OF BOTH EARS: ICD-10-CM

## 2021-03-03 DIAGNOSIS — J32.0 CHRONIC MAXILLARY SINUSITIS: ICD-10-CM

## 2021-03-03 DIAGNOSIS — J32.2 CHRONIC ETHMOIDAL SINUSITIS: Primary | ICD-10-CM

## 2021-05-09 VITALS
HEART RATE: 98 BPM | DIASTOLIC BLOOD PRESSURE: 98 MMHG | OXYGEN SATURATION: 98 % | WEIGHT: 293 LBS | TEMPERATURE: 97.8 F | SYSTOLIC BLOOD PRESSURE: 146 MMHG

## 2021-05-09 VITALS
BODY MASS INDEX: 39.74 KG/M2 | HEIGHT: 65 IN | TEMPERATURE: 98.2 F | WEIGHT: 238.5 LBS | SYSTOLIC BLOOD PRESSURE: 130 MMHG | OXYGEN SATURATION: 99 % | DIASTOLIC BLOOD PRESSURE: 78 MMHG | HEART RATE: 106 BPM

## 2021-05-09 VITALS
SYSTOLIC BLOOD PRESSURE: 140 MMHG | HEART RATE: 90 BPM | DIASTOLIC BLOOD PRESSURE: 98 MMHG | OXYGEN SATURATION: 99 % | TEMPERATURE: 97.4 F | WEIGHT: 293 LBS

## 2021-07-06 RX ORDER — LORATADINE 10 MG/1
10 CAPSULE, LIQUID FILLED ORAL DAILY
Qty: 90 EACH | Refills: 1 | Status: SHIPPED | OUTPATIENT
Start: 2021-07-06

## 2021-07-06 NOTE — TELEPHONE ENCOUNTER
Caller: Joann Smith    Relationship: Self    Best call back number: [phone]    Medication needed:   Requested Prescriptions     Pending Prescriptions Disp Refills   • Loratadine 10 MG capsule 90 each 1     Sig: Take 1 capsule by mouth Daily.       When do you need the refill by: ASAP    What additional details did the patient provide when requesting the medication: PATIENT IS OUT OF ALLERGY MEDS    Does the patient have less than a 3 day supply:  [x] Yes  [] No    What is the patient's preferred pharmacy: [facility] [place] [address] - [phone]  - [phone] FX

## 2021-09-13 ENCOUNTER — TELEPHONE (OUTPATIENT)
Dept: FAMILY MEDICINE CLINIC | Facility: CLINIC | Age: 36
End: 2021-09-13

## 2021-09-13 NOTE — TELEPHONE ENCOUNTER
Ok hub to read:    Patient has been dismissed from [facility] with Negar Pérez due to leaving the practice to establish care with ZULMA Hollingsworth @ [facility] office. She cancelled and No Showed her last two appts with our office.